# Patient Record
Sex: FEMALE | Race: WHITE | NOT HISPANIC OR LATINO | Employment: FULL TIME | URBAN - NONMETROPOLITAN AREA
[De-identification: names, ages, dates, MRNs, and addresses within clinical notes are randomized per-mention and may not be internally consistent; named-entity substitution may affect disease eponyms.]

---

## 2017-01-31 ENCOUNTER — OFFICE VISIT (OUTPATIENT)
Dept: RETAIL CLINIC | Facility: CLINIC | Age: 42
End: 2017-01-31

## 2017-01-31 VITALS
HEART RATE: 100 BPM | WEIGHT: 126.8 LBS | TEMPERATURE: 99.4 F | OXYGEN SATURATION: 98 % | SYSTOLIC BLOOD PRESSURE: 107 MMHG | RESPIRATION RATE: 18 BRPM | BODY MASS INDEX: 23.19 KG/M2 | DIASTOLIC BLOOD PRESSURE: 80 MMHG

## 2017-01-31 DIAGNOSIS — J11.1 INFLUENZA: Primary | ICD-10-CM

## 2017-01-31 LAB
EXPIRATION DATE: ABNORMAL
FLUAV AG NPH QL: POSITIVE
FLUBV AG NPH QL: NEGATIVE
INTERNAL CONTROL: ABNORMAL
Lab: ABNORMAL

## 2017-01-31 PROCEDURE — 87804 INFLUENZA ASSAY W/OPTIC: CPT | Performed by: NURSE PRACTITIONER

## 2017-01-31 PROCEDURE — 99213 OFFICE O/P EST LOW 20 MIN: CPT | Performed by: NURSE PRACTITIONER

## 2017-01-31 RX ORDER — DEXTROMETHORPHAN HYDROBROMIDE AND PROMETHAZINE HYDROCHLORIDE 15; 6.25 MG/5ML; MG/5ML
5 SYRUP ORAL EVERY 6 HOURS PRN
Qty: 120 ML | Refills: 0 | Status: SHIPPED | OUTPATIENT
Start: 2017-01-31 | End: 2018-07-17

## 2017-01-31 NOTE — MR AVS SNAPSHOT
"                        Tahira Vessels   1/31/2017 3:00 PM   Office Visit    Dept Phone:  [phone]   Encounter #:  [id number]    Provider:  SILVINO MCGILL   Department:  [facility]                Your Full Care Plan              Today's Medication Changes          These changes are accurate as of: 1/31/17  4:12 PM.  If you have any questions, ask your nurse or doctor.               New Medication(s)Ordered:     promethazine-dextromethorphan 6.25-15 MG/5ML syrup   Commonly known as:  PROMETHAZINE-DM   Take 5 mL by mouth Every 6 (Six) Hours As Needed for cough.            Where to Get Your Medications      These medications were sent to [facility]/pharmacy #[id number] - [place], [place] - [address] - [phone]  - [phone] [address] [address]     Phone:  [phone]     promethazine-dextromethorphan 6.25-15 MG/5ML syrup                  Your Updated Medication List          This list is accurate as of: 1/31/17  4:12 PM.  Always use your most recent med list.                ALEVE PO       CALCIUM + D PO       MUCINEX ALLERGY PO       MULTIVITAMIN ADULT PO       promethazine-dextromethorphan 6.25-15 MG/5ML syrup   Commonly known as:  PROMETHAZINE-DM   Take 5 mL by mouth Every 6 (Six) Hours As Needed for cough.               We Performed the Following     POC Influenza A / B       You Were Diagnosed With        Codes Comments    Influenza    -  Primary ICD-10-CM: J11.1  ICD-9-CM: 487.1       Instructions    Influenza, Adult  Influenza (\"the flu\") is a viral infection of the respiratory tract. It occurs more often in winter months because people spend more time in close contact with one another. Influenza can make you feel very sick. Influenza easily spreads from person to person (contagious).  CAUSES   Influenza is caused by a virus that infects the respiratory tract. You can catch the virus by breathing in droplets from an infected person's cough or sneeze. You can also " catch the virus by touching something that was recently contaminated with the virus and then touching your mouth, nose, or eyes.  RISKS AND COMPLICATIONS  You may be at risk for a more severe case of influenza if you smoke cigarettes, have diabetes, have chronic heart disease (such as heart failure) or lung disease (such as asthma), or if you have a weakened immune system. Elderly people and pregnant women are also at risk for more serious infections. The most common problem of influenza is a lung infection (pneumonia). Sometimes, this problem can require emergency medical care and may be life threatening.  SIGNS AND SYMPTOMS   Symptoms typically last 4 to 10 days and may include:  · Fever.  · Chills.  · Headache, body aches, and muscle aches.  · Sore throat.  · Chest discomfort and cough.  · Poor appetite.  · Weakness or feeling tired.  · Dizziness.  · Nausea or vomiting.  DIAGNOSIS   Diagnosis of influenza is often made based on your history and a physical exam. A nose or throat swab test can be done to confirm the diagnosis.  TREATMENT   In mild cases, influenza goes away on its own. Treatment is directed at relieving symptoms. For more severe cases, your health care provider may prescribe antiviral medicines to shorten the sickness. Antibiotic medicines are not effective because the infection is caused by a virus, not by bacteria.  HOME CARE INSTRUCTIONS  · Take medicines only as directed by your health care provider.  · Use a cool mist humidifier to make breathing easier.  · Get plenty of rest until your temperature returns to normal. This usually takes 3 to 4 days.  · Drink enough fluid to keep your urine clear or pale yellow.  · Cover your mouth and nose when coughing or sneezing, and wash your hands well to prevent the virus from spreading.  · Stay home from work or school until the fever is gone for at least 1 full day.  PREVENTION   An annual influenza vaccination (flu shot) is the best way to avoid  getting influenza. An annual flu shot is now routinely recommended for all adults in the [place]  SEEK MEDICAL CARE IF:  · You experience chest pain, your cough worsens, or you produce more mucus.  · You have nausea, vomiting, or diarrhea.  · Your fever returns or gets worse.  SEEK IMMEDIATE MEDICAL CARE IF:  · You have trouble breathing, you become short of breath, or your skin or nails become bluish.  · You have severe pain or stiffness in the neck.  · You develop a sudden headache, or pain in the face or ear.  · You have nausea or vomiting that you cannot control.  MAKE SURE YOU:   · Understand these instructions.  · Will watch your condition.  · Will get help right away if you are not doing well or get worse.     This information is not intended to replace advice given to you by your health care provider. Make sure you discuss any questions you have with your health care provider.     Document Released: 12/15/2001 Document Revised: 01/08/2016 Document Reviewed: 03/18/2013  [employer] Interactive Patient Education ©2016 [employer].    Dextromethorphan; Promethazine oral solution  What is this medicine?  DEXTROMETHORPHAN; PROMETHAZINE (dex troe meth OR fan; proe METH a zeen) is a cough suppressant and an antihistamine. It is used to treat coughing due to colds or allergies. This medicine will not treat an infection.  This medicine may be used for other purposes; ask your health care provider or pharmacist if you have questions.  What should I tell my health care provider before I take this medicine?  They need to know if you have any of the following conditions:  -asthma or other lung disease  -diabetes  -eczema  -seizure disorder  -serious or chronic illness  -sleep apnea  -an unusual or allergic reaction to dextromethorphan, promethazine, phenothiazines, other medicines, foods, dyes, or preservatives  -pregnant or trying to get pregnant  -breast-feeding  How should I use this medicine?  Take this medicine by mouth  with a glass of water. Follow the directions on the prescription label. Use a specially marked spoon or container to measure your medicine. Household spoons are not accurate. Take your doses at regular intervals. Do not take your medicine more often than directed.  Talk to your pediatrician regarding the use of this medicine in children. Special care may be needed. Do not use this medicine in children less than 2 years of age.  Patients over 65 years old may have a stronger reaction and need a smaller dose.  Overdosage: If you think you have taken too much of this medicine contact a poison control center or emergency room at once.  NOTE: This medicine is only for you. Do not share this medicine with others.  What if I miss a dose?  If you miss a dose, take it as soon as you can. If it is almost time for your next dose, take only that dose. Do not take double or extra doses.  What may interact with this medicine?  Do not take this medicine with any of the following medications:  -MAOIs like Carbex, Eldepryl, Marplan, Nardil, and Parnate  This medicine may also interact with the following medications:  -alcohol or alcohol-containing products  -barbiturate medicines like phenobarbital  -epinephrine  -medicines for depression, anxiety or psychotic disturbances  -medicines for Parkinson's disease  -medicines for sleep  -medicines for the stomach like metoclopramide, dicyclomine, glycopyrrolate  -pain medicines  -radio contrast dyes  -sibutramine  -some medicines for cold or allergies  This list may not describe all possible interactions. Give your health care provider a list of all the medicines, herbs, non-prescription drugs, or dietary supplements you use. Also tell them if you smoke, drink alcohol, or use illegal drugs. Some items may interact with your medicine.  What should I watch for while using this medicine?  Tell your doctor if your symptoms do not improve or if they get worse.  You may get drowsy or dizzy. Do  not drive, use machinery, or do anything that needs mental alertness until you know how this medicine affects you. Do not stand or sit up quickly, especially if you are an older patient. This reduces the risk of dizzy or fainting spells. Alcohol may interfere with the effect of this medicine. Avoid alcoholic drinks.  This medicine can make you more sensitive to the sun. Keep out of the sun. If you cannot avoid being in the sun, wear protective clothing and use sunscreen. Do not use sun lamps or tanning beds/booths.  What side effects may I notice from receiving this medicine?  Side effects that you should report to your doctor or health care professional as soon as possible:  -allergic reactions like skin rash, itching or hives, swelling of the face, lips, or tongue  -breathing problems  -changes in vision  -confused, disoriented, excitable  -fast, irregular heartbeat  -fever, sweating  -hallucinations  -high or low blood pressure  -lightheaded  -muscle stiffness  -seizures  -tremors, twitches  -yellow eyes or skin  Side effects that usually do not require medical attention (report to your doctor or health care professional if they continue or are bothersome):  -congestion in the nose  -dry mouth  -nausea, vomiting  -stomach upset  -trouble sleeping  This list may not describe all possible side effects. Call your doctor for medical advice about side effects. You may report side effects to FDA at 0-078-FDA-9305.  Where should I keep my medicine?  Keep out of the reach of children.  Store at room temperature between 20 and 25 degrees C (68 and 77 degrees F). Protect from light. Throw away any unused medicine after the expiration date.  NOTE: This sheet is a summary. It may not cover all possible information. If you have questions about this medicine, talk to your doctor, pharmacist, or health care provider.     © 2016, Elsevier/[employer]. (2009-04-06 17:01:49)    Your flu test was positive for influenza A.   "Increase rest and fluids. Continue Aleve or Advil as per package instructions for fever. Okay to take Tylenol every 4-6 hours. Take promethazine DM as needed to decrease drainage and cough.  Use caution as it will likely make you drowsy. Okay to take plain Mucinex along with the promethazine DM.  You can take Mucinex DM during the day and use the promethazine DM at bedtime.   If symptoms worsen please see your primary care provider.     Patient Instructions History      Upcoming Appointments     Visit Type Date Time Department    OFFICE VISIT 2017  3:00 PM [facility]      [employer] Signup     [facility] [employer] allows you to send messages to your doctor, view your test results, renew your prescriptions, schedule appointments, and more. To sign up, go to [employer] and click on the Sign Up Now link in the New User? box. Enter your [employer] Activation Code exactly as it appears below along with the last four digits of your Social Security Number and your Date of Birth () to complete the sign-up process. If you do not sign up before the expiration date, you must request a new code.    [employer] Activation Code: [email or web address]  Expires: 2017  4:11 PM    If you have questions, you can email [email or web address] or call [phone] to talk to our [employer] staff. Remember, [employer] is NOT to be used for urgent needs. For medical emergencies, dial 911.               Other Info from Your Visit           Allergies     No Known Allergies      Reason for Visit     Generalized Body Aches body aches, mainly the back began on today    Cough dry, hacking cough for the past 2 days    Earache Patient states she has had intermittent \"shooting pain\" in the left ear on today    Fever LOW GRADE FEVER    URI Patient states she has feeling of chest tightness which began on today      Vital Signs     Blood Pressure Pulse Temperature Respirations Weight Last Menstrual Period    107/80 (BP " Location: Right arm, Patient Position: Sitting) 100 99.4 °F (37.4 °C) (Oral) 18 126 lb 12.8 oz (57.5 kg) 01/06/2017    Oxygen Saturation Body Mass Index Smoking Status             98% 23.19 kg/m2 Never Smoker         Problems and Diagnoses Noted     Flu    -  Primary

## 2017-01-31 NOTE — PROGRESS NOTES
"  Chief Complaint   Patient presents with   • Generalized Body Aches     body aches, mainly the back began on today   • Cough     dry, hacking cough for the past 2 days   • Earache     Patient states she has had intermittent \"shooting pain\" in the left ear on today   • Fever     LOW GRADE FEVER   • URI     Patient states she has feeling of chest tightness which began on today     Ata Dinh is a 41 y.o. female who presents to the clinic today with complaints of a dry hacky cough which started two days ago.  She reports early  morning she just didn't feel well. She felt like she was getting a chest cold. She has had a low grade fever and fatigue.  She started having a little upper anterior chest tightness today which improves with cough. Today she has felt worse and has body aches. She has been taking Mucinex DM and took Aleve earlier today. Her  has had a sore throat and low grade fever.     The following portions of the patient's history were reviewed and updated as appropriate: allergies, past family history, past medical history, past social history, past surgical history and problem list.    Current Outpatient Prescriptions:   •  Calcium Citrate-Vitamin D (CALCIUM + D PO), Take  by mouth., Disp: , Rfl:   •  Mucinex DM  •  Multiple Vitamins-Minerals (MULTIVITAMIN ADULT PO), Take  by mouth., Disp: , Rfl:   •  Naproxen Sodium (ALEVE PO), Take  by mouth As Needed (last taken around noon today)., Disp: , Rfl:     Allergies:  Review of patient's allergies indicates no known allergies.  History reviewed. No pertinent past medical history.  Past Surgical History   Procedure Laterality Date   •  section     • Bowel resection       intassuception as an infant.   • [place] tooth extraction       Family History   Problem Relation Age of Onset   • Leukemia Father    • No Known Problems Mother    • Hypertension Brother    • Breast cancer Neg Hx    • Uterine cancer Neg Hx    • Ovarian cancer " Neg Hx    • Colon cancer Neg Hx    • Melanoma Neg Hx    • Prostate cancer Neg Hx      Social History   Substance Use Topics   • Smoking status: Never Smoker   • Smokeless tobacco: Never Used   • Alcohol use Yes       Review of Systems  Review of Systems   Constitutional: Positive for fatigue and fever. Negative for chills.   HENT: Positive for ear pain (left ear shooting pain off and on today) and postnasal drip. Negative for sinus pressure and sore throat.    Respiratory: Positive for cough (dry) and chest tightness (a little better with cough). Negative for shortness of breath and wheezing.    Neurological: Negative for headaches.       Objective   Visit Vitals   • /80 (BP Location: Right arm, Patient Position: Sitting)   • Pulse 100   • Temp 99.4 °F (37.4 °C) (Oral)   • Resp 18   • Wt 126 lb 12.8 oz (57.5 kg)   • LMP 01/06/2017   • SpO2 98%   • BMI 23.19 kg/m2     Last 2 weights    01/31/17  1508   Weight: 126 lb 12.8 oz (57.5 kg)       Physical Exam   Constitutional: She is oriented to person, place, and time. She appears well-developed and well-nourished. She is cooperative. She appears ill (mildly). No distress.   HENT:   Head: Normocephalic and atraumatic.   Right Ear: External ear normal.   Left Ear: External ear normal.   Nose: Nose normal. Right sinus exhibits no maxillary sinus tenderness and no frontal sinus tenderness. Left sinus exhibits no maxillary sinus tenderness and no frontal sinus tenderness.   Mouth/Throat: Posterior oropharyngeal erythema (minimal erythema, mild cobblestoning, clear post nasal drip noted) present. Tonsils are 2+ on the right. Tonsils are 2+ on the left. No tonsillar exudate.   Bilateral canals clear until near TM where there is cerumen obscurring the TM, very small portion of both TMs visualized and appeared gray. Pt declined to have ears cleaned today.   Eyes: Conjunctivae, EOM and lids are normal. Pupils are equal, round, and reactive to light.   Neck: Trachea normal  and normal range of motion. Neck supple.   Cardiovascular: Normal rate, regular rhythm, S1 normal, S2 normal and normal heart sounds.    Pulmonary/Chest: Effort normal and breath sounds normal. She has no wheezes. She has no rhonchi. She has no rales.   Lymphadenopathy:     She has no cervical adenopathy.   Neurological: She is alert and oriented to person, place, and time. Coordination and gait normal.   Skin: Skin is warm, dry and intact.   Psychiatric: She has a normal mood and affect. Her speech is normal and behavior is normal.   Vitals reviewed.      Assessment/Plan     Tahira was seen today for generalized body aches, cough, earache, fever and uri.    Diagnoses and all orders for this visit:    Influenza  -     POC Influenza A / B    Other orders  -     promethazine-dextromethorphan (PROMETHAZINE-DM) 6.25-15 MG/5ML syrup; Take 5 mL by mouth Every 6 (Six) Hours As Needed for cough.       Increase rest and fluids. Continue Aleve or Advil as per package instructions for fever. Okay to take Tylenol every 4-6 hours. Take promethazine DM as needed to decrease drainage and cough.  Use caution as it will likely make you drowsy. Okay to take plain Mucinex along with the promethazine DM.  You can take Mucinex DM during the day and use the promethazine DM at bedtime.   If symptoms worsen please see your primary care provider.

## 2017-01-31 NOTE — PATIENT INSTRUCTIONS
"Influenza, Adult  Influenza (\"the flu\") is a viral infection of the respiratory tract. It occurs more often in winter months because people spend more time in close contact with one another. Influenza can make you feel very sick. Influenza easily spreads from person to person (contagious).  CAUSES   Influenza is caused by a virus that infects the respiratory tract. You can catch the virus by breathing in droplets from an infected person's cough or sneeze. You can also catch the virus by touching something that was recently contaminated with the virus and then touching your mouth, nose, or eyes.  RISKS AND COMPLICATIONS  You may be at risk for a more severe case of influenza if you smoke cigarettes, have diabetes, have chronic heart disease (such as heart failure) or lung disease (such as asthma), or if you have a weakened immune system. Elderly people and pregnant women are also at risk for more serious infections. The most common problem of influenza is a lung infection (pneumonia). Sometimes, this problem can require emergency medical care and may be life threatening.  SIGNS AND SYMPTOMS   Symptoms typically last 4 to 10 days and may include:  · Fever.  · Chills.  · Headache, body aches, and muscle aches.  · Sore throat.  · Chest discomfort and cough.  · Poor appetite.  · Weakness or feeling tired.  · Dizziness.  · Nausea or vomiting.  DIAGNOSIS   Diagnosis of influenza is often made based on your history and a physical exam. A nose or throat swab test can be done to confirm the diagnosis.  TREATMENT   In mild cases, influenza goes away on its own. Treatment is directed at relieving symptoms. For more severe cases, your health care provider may prescribe antiviral medicines to shorten the sickness. Antibiotic medicines are not effective because the infection is caused by a virus, not by bacteria.  HOME CARE INSTRUCTIONS  · Take medicines only as directed by your health care provider.  · Use a cool mist humidifier " to make breathing easier.  · Get plenty of rest until your temperature returns to normal. This usually takes 3 to 4 days.  · Drink enough fluid to keep your urine clear or pale yellow.  · Cover your mouth and nose when coughing or sneezing, and wash your hands well to prevent the virus from spreading.  · Stay home from work or school until the fever is gone for at least 1 full day.  PREVENTION   An annual influenza vaccination (flu shot) is the best way to avoid getting influenza. An annual flu shot is now routinely recommended for all adults in the [place]  SEEK MEDICAL CARE IF:  · You experience chest pain, your cough worsens, or you produce more mucus.  · You have nausea, vomiting, or diarrhea.  · Your fever returns or gets worse.  SEEK IMMEDIATE MEDICAL CARE IF:  · You have trouble breathing, you become short of breath, or your skin or nails become bluish.  · You have severe pain or stiffness in the neck.  · You develop a sudden headache, or pain in the face or ear.  · You have nausea or vomiting that you cannot control.  MAKE SURE YOU:   · Understand these instructions.  · Will watch your condition.  · Will get help right away if you are not doing well or get worse.     This information is not intended to replace advice given to you by your health care provider. Make sure you discuss any questions you have with your health care provider.     Document Released: 12/15/2001 Document Revised: 01/08/2016 Document Reviewed: 03/18/2013  [employer] Patient Education ©2016 [employer]    Dextromethorphan; Promethazine oral solution  What is this medicine?  DEXTROMETHORPHAN; PROMETHAZINE (dex troe meth OR fan; proe METH a zeen) is a cough suppressant and an antihistamine. It is used to treat coughing due to colds or allergies. This medicine will not treat an infection.  This medicine may be used for other purposes; ask your health care provider or pharmacist if you have questions.  What should I tell my health  care provider before I take this medicine?  They need to know if you have any of the following conditions:  -asthma or other lung disease  -diabetes  -eczema  -seizure disorder  -serious or chronic illness  -sleep apnea  -an unusual or allergic reaction to dextromethorphan, promethazine, phenothiazines, other medicines, foods, dyes, or preservatives  -pregnant or trying to get pregnant  -breast-feeding  How should I use this medicine?  Take this medicine by mouth with a glass of water. Follow the directions on the prescription label. Use a specially marked spoon or container to measure your medicine. Household spoons are not accurate. Take your doses at regular intervals. Do not take your medicine more often than directed.  Talk to your pediatrician regarding the use of this medicine in children. Special care may be needed. Do not use this medicine in children less than 2 years of age.  Patients over 65 years old may have a stronger reaction and need a smaller dose.  Overdosage: If you think you have taken too much of this medicine contact a poison control center or emergency room at once.  NOTE: This medicine is only for you. Do not share this medicine with others.  What if I miss a dose?  If you miss a dose, take it as soon as you can. If it is almost time for your next dose, take only that dose. Do not take double or extra doses.  What may interact with this medicine?  Do not take this medicine with any of the following medications:  -MAOIs like Carbex, Eldepryl, Marplan, Nardil, and Parnate  This medicine may also interact with the following medications:  -alcohol or alcohol-containing products  -barbiturate medicines like phenobarbital  -epinephrine  -medicines for depression, anxiety or psychotic disturbances  -medicines for Parkinson's disease  -medicines for sleep  -medicines for the stomach like metoclopramide, dicyclomine, glycopyrrolate  -pain medicines  -radio contrast dyes  -sibutramine  -some medicines  for cold or allergies  This list may not describe all possible interactions. Give your health care provider a list of all the medicines, herbs, non-prescription drugs, or dietary supplements you use. Also tell them if you smoke, drink alcohol, or use illegal drugs. Some items may interact with your medicine.  What should I watch for while using this medicine?  Tell your doctor if your symptoms do not improve or if they get worse.  You may get drowsy or dizzy. Do not drive, use machinery, or do anything that needs mental alertness until you know how this medicine affects you. Do not stand or sit up quickly, especially if you are an older patient. This reduces the risk of dizzy or fainting spells. Alcohol may interfere with the effect of this medicine. Avoid alcoholic drinks.  This medicine can make you more sensitive to the sun. Keep out of the sun. If you cannot avoid being in the sun, wear protective clothing and use sunscreen. Do not use sun lamps or tanning beds/booths.  What side effects may I notice from receiving this medicine?  Side effects that you should report to your doctor or health care professional as soon as possible:  -allergic reactions like skin rash, itching or hives, swelling of the face, lips, or tongue  -breathing problems  -changes in vision  -confused, disoriented, excitable  -fast, irregular heartbeat  -fever, sweating  -hallucinations  -high or low blood pressure  -lightheaded  -muscle stiffness  -seizures  -tremors, twitches  -yellow eyes or skin  Side effects that usually do not require medical attention (report to your doctor or health care professional if they continue or are bothersome):  -congestion in the nose  -dry mouth  -nausea, vomiting  -stomach upset  -trouble sleeping  This list may not describe all possible side effects. Call your doctor for medical advice about side effects. You may report side effects to FDA at 5-906-FDA-5660.  Where should I keep my medicine?  Keep out of  the reach of children.  Store at room temperature between 20 and 25 degrees C (68 and 77 degrees F). Protect from light. Throw away any unused medicine after the expiration date.  NOTE: This sheet is a summary. It may not cover all possible information. If you have questions about this medicine, talk to your doctor, pharmacist, or health care provider.     © 2016, [employer]. (2009-04-06 17:01:49)    Your flu test was positive for influenza A.  Increase rest and fluids. Continue Aleve or Advil as per package instructions for fever. Okay to take Tylenol every 4-6 hours. Take promethazine DM as needed to decrease drainage and cough.  Use caution as it will likely make you drowsy. Okay to take plain Mucinex along with the promethazine DM.  You can take Mucinex DM during the day and use the promethazine DM at bedtime.   If symptoms worsen please see your primary care provider.

## 2017-03-10 ENCOUNTER — TELEPHONE (OUTPATIENT)
Dept: OBSTETRICS AND GYNECOLOGY | Facility: CLINIC | Age: 42
End: 2017-03-10

## 2017-03-10 NOTE — TELEPHONE ENCOUNTER
Patient has been an no show for lab orders. Do you want to cancel these orders or contact patient.

## 2017-04-26 ENCOUNTER — OFFICE VISIT (OUTPATIENT)
Dept: RETAIL CLINIC | Facility: CLINIC | Age: 42
End: 2017-04-26

## 2017-04-26 DIAGNOSIS — H61.23 BILATERAL IMPACTED CERUMEN: Primary | ICD-10-CM

## 2017-04-26 PROCEDURE — 69210 REMOVE IMPACTED EAR WAX UNI: CPT | Performed by: NURSE PRACTITIONER

## 2017-04-26 NOTE — PROGRESS NOTES
Procedure   Ear Cerumen Removal Instrumentation  Date/Time: 4/26/2017 8:50 AM  Performed by: WILLIAMS RAINEY  Authorized by: WILLIAMS RAINEY  Consent: Verbal consent obtained.  Consent given by: patient  Patient understanding: patient states understanding of the procedure being performed  Location details: bilateral ears  Procedure type: curette and irrigation  Patient tolerance: Patient tolerated the procedure well with no immediate complications  Comments: Large amount of impacted cerumen noted in bilateral ear canals; TM obstructed by cerumen.  1:1 solution of water and hydrogen peroxide used as irrigation along with curette to remove cerumen.  Large amount of dark brown cerumen removed from bilateral ear canals.  TM's visualized and intact.            Discussed with patient use of OTC products, such as Debrox, as needed to help prevent build up and impaction of cerumen.

## 2018-07-17 ENCOUNTER — OFFICE VISIT (OUTPATIENT)
Dept: OBSTETRICS AND GYNECOLOGY | Facility: CLINIC | Age: 43
End: 2018-07-17

## 2018-07-17 VITALS
DIASTOLIC BLOOD PRESSURE: 66 MMHG | BODY MASS INDEX: 26.68 KG/M2 | SYSTOLIC BLOOD PRESSURE: 104 MMHG | HEIGHT: 62 IN | WEIGHT: 145 LBS

## 2018-07-17 DIAGNOSIS — Z01.419 WELL WOMAN EXAM WITH ROUTINE GYNECOLOGICAL EXAM: Primary | ICD-10-CM

## 2018-07-17 DIAGNOSIS — N64.4 BREAST TENDERNESS: ICD-10-CM

## 2018-07-17 DIAGNOSIS — N60.02 CYST OF LEFT BREAST: ICD-10-CM

## 2018-07-17 DIAGNOSIS — R10.2 PELVIC PAIN: ICD-10-CM

## 2018-07-17 PROCEDURE — 99213 OFFICE O/P EST LOW 20 MIN: CPT | Performed by: NURSE PRACTITIONER

## 2018-07-17 PROCEDURE — G0123 SCREEN CERV/VAG THIN LAYER: HCPCS | Performed by: NURSE PRACTITIONER

## 2018-07-17 PROCEDURE — 99396 PREV VISIT EST AGE 40-64: CPT | Performed by: NURSE PRACTITIONER

## 2018-07-17 PROCEDURE — 87624 HPV HI-RISK TYP POOLED RSLT: CPT | Performed by: NURSE PRACTITIONER

## 2018-07-17 NOTE — PROGRESS NOTES
"Ata Dinh is a 42 y.o. female    Here for yearly check up. Has C/O breast tenderness.      Gynecologic Exam   The patient's pertinent negatives include no pelvic pain, vaginal bleeding or vaginal discharge. The patient is experiencing no pain. Pertinent negatives include no abdominal pain, anorexia, back pain, chills, constipation, diarrhea, discolored urine, dysuria, fever, flank pain, frequency, headaches, hematuria, joint pain, joint swelling, nausea, painful intercourse, rash, sore throat, urgency or vomiting. Nothing aggravates the symptoms. She is sexually active. She uses the rhythm method for contraception. Her menstrual history has been regular.         /66   Ht 157.5 cm (62\")   Wt 65.8 kg (145 lb)   LMP 2018 (Exact Date)   BMI 26.52 kg/m²     Outpatient Encounter Prescriptions as of 2018   Medication Sig Dispense Refill   • Calcium Citrate-Vitamin D (CALCIUM + D PO) Take  by mouth.     • Multiple Vitamins-Minerals (MULTIVITAMIN ADULT PO) Take  by mouth.     • Naproxen Sodium (ALEVE PO) Take  by mouth As Needed (last taken around noon today).     • [DISCONTINUED] Fexofenadine HCl (MUCINEX ALLERGY PO) Take  by mouth As Needed.     • [DISCONTINUED] promethazine-dextromethorphan (PROMETHAZINE-DM) 6.25-15 MG/5ML syrup Take 5 mL by mouth Every 6 (Six) Hours As Needed for cough. 120 mL 0     No facility-administered encounter medications on file as of 2018.        Surgical History  Past Surgical History:   Procedure Laterality Date   •  SECTION     • COLON RESECTION      intassuception as an infant.   • WISDOM TOOTH EXTRACTION         Family History  Family History   Problem Relation Age of Onset   • Leukemia Father    • No Known Problems Mother    • Hypertension Brother    • Breast cancer Neg Hx    • Uterine cancer Neg Hx    • Ovarian cancer Neg Hx    • Colon cancer Neg Hx    • Melanoma Neg Hx    • Prostate cancer Neg Hx        The following portions of the " patient's history were reviewed and updated as appropriate: allergies, current medications, past family history, past medical history, past social history, past surgical history and problem list.    Review of Systems   Constitutional: Negative for activity change, appetite change, chills, diaphoresis, fatigue, fever, unexpected weight gain and unexpected weight loss.   HENT: Negative for congestion, dental problem, drooling, ear discharge, ear pain, facial swelling, hearing loss, mouth sores, nosebleeds, postnasal drip, rhinorrhea, sinus pressure, sneezing, sore throat, tinnitus, trouble swallowing and voice change.    Eyes: Negative for blurred vision, double vision, photophobia, pain, discharge, redness, itching and visual disturbance.   Respiratory: Negative for apnea, cough, choking, chest tightness, shortness of breath, wheezing and stridor.    Cardiovascular: Negative for chest pain, palpitations and leg swelling.   Gastrointestinal: Negative for abdominal distention, abdominal pain, anal bleeding, anorexia, blood in stool, constipation, diarrhea, nausea, rectal pain, vomiting, GERD and indigestion.   Endocrine: Negative for cold intolerance, heat intolerance, polydipsia, polyphagia, polyuria and breast discharge.   Genitourinary: Positive for breast pain. Negative for urinary incontinence, decreased libido, decreased urine volume, difficulty urinating, dyspareunia, dysuria, flank pain, frequency, genital sores, hematuria, menstrual problem, pelvic pain, urgency, vaginal bleeding, vaginal discharge, vaginal pain and breast lump.   Musculoskeletal: Negative for arthralgias, back pain, gait problem, joint pain, joint swelling, myalgias, neck pain, neck stiffness and bursitis.   Skin: Negative for color change, dry skin and rash.   Allergic/Immunologic: Negative for environmental allergies, food allergies and immunocompromised state.   Neurological: Negative for dizziness, tremors, seizures, syncope, facial  asymmetry, speech difficulty, weakness, light-headedness, numbness, headache, memory problem and confusion.   Hematological: Negative for adenopathy. Does not bruise/bleed easily.   Psychiatric/Behavioral: Negative for agitation, behavioral problems, decreased concentration, dysphoric mood, hallucinations, self-injury, sleep disturbance, suicidal ideas, negative for hyperactivity, depressed mood and stress. The patient is not nervous/anxious.        Objective   Physical Exam   Constitutional: She is oriented to person, place, and time. She appears well-developed and well-nourished. No distress.   HENT:   Head: Normocephalic.   Right Ear: External ear normal.   Left Ear: External ear normal.   Nose: Nose normal.   Mouth/Throat: Oropharynx is clear and moist.   Eyes: Conjunctivae are normal. Right eye exhibits no discharge. Left eye exhibits no discharge. No scleral icterus.   Neck: Normal range of motion. Neck supple. Carotid bruit is not present. No tracheal deviation present. No thyromegaly present.   Cardiovascular: Normal rate, regular rhythm, normal heart sounds and intact distal pulses.    No murmur heard.  Pulmonary/Chest: Effort normal and breath sounds normal. No respiratory distress. She has no wheezes. Right breast exhibits no inverted nipple, no mass, no nipple discharge, no skin change and no tenderness. Left breast exhibits no inverted nipple, no mass, no nipple discharge, no skin change and no tenderness. Breasts are symmetrical. There is no breast swelling.       Abdominal: Soft. She exhibits no distension and no mass. There is no tenderness. There is no guarding. No hernia. Hernia confirmed negative in the right inguinal area and confirmed negative in the left inguinal area.   Genitourinary: Rectum normal, vagina normal and uterus normal. Rectal exam shows no mass. No breast tenderness, discharge or bleeding. Pelvic exam was performed with patient supine. There is no rash, tenderness, lesion or  injury on the right labia. There is no rash, tenderness, lesion or injury on the left labia. Uterus is not enlarged, not fixed and not tender. Cervix exhibits no motion tenderness, no discharge and no friability. Right adnexum displays no mass, no tenderness and no fullness. Left adnexum displays no mass, no tenderness and no fullness. No erythema, tenderness or bleeding in the vagina. No foreign body in the vagina. No signs of injury around the vagina. No vaginal discharge found.   Genitourinary Comments:   BSU normal  Urethral meatus  Normal  Perineum  Normal   Musculoskeletal: Normal range of motion. She exhibits no edema or tenderness.   Lymphadenopathy:        Head (right side): No submental, no submandibular, no tonsillar, no preauricular, no posterior auricular and no occipital adenopathy present.        Head (left side): No submental, no submandibular, no tonsillar, no preauricular, no posterior auricular and no occipital adenopathy present.     She has no cervical adenopathy.        Right cervical: No superficial cervical, no deep cervical and no posterior cervical adenopathy present.       Left cervical: No superficial cervical, no deep cervical and no posterior cervical adenopathy present.     She has no axillary adenopathy.        Right: No inguinal adenopathy present.        Left: No inguinal adenopathy present.   Neurological: She is alert and oriented to person, place, and time. Coordination normal.   Skin: Skin is warm and dry. No bruising and no rash noted. She is not diaphoretic. No erythema.   Psychiatric: She has a normal mood and affect. Her behavior is normal. Judgment and thought content normal.   Nursing note and vitals reviewed.      Assessment/Plan   Tahira was seen today for gynecologic exam.    Diagnoses and all orders for this visit:    Well woman exam with routine gynecological exam  Normal GYN exam. Will RTO for lab work . Encouraged SBE, pt is aware how to do self breast exam and the  importance of same. Discussed weight management and importance of maintaining a healthy weight. Discussed Vitamin D intake and the importance of adequate vitamin D for both Bone Health and a healthy immune system.  Discussed Daily exercise and the importance of same, in regards to a healthy heart as well as helping to maintain her weight and improving her mental health.  BMI 26.5.  Mammogram will be scheduled at [facility], per her request. Pap smear is done per ASCCP guidelines.  -     Liquid-based Pap Smear, Screening  -     CBC & Differential  -     Comprehensive Metabolic Panel  -     Lipid Panel With LDL / HDL Ratio  -     TSH  -     T3, Uptake  -     Vitamin D 25 Hydroxy  -     T4, Free  -     Hemoglobin A1c  -     UA / M With / Rflx Culture([facility] ONLY) - Urine, Clean Catch  -     Mammo Screening Digital Tomosynthesis Bilateral With CAD; Future    Breast tenderness  Comments:  Has moderate fibrocystic changes. Will try Vitamin E 400IU and Oil of Evening Primrose. Mammo is scheduled.     Pelvic pain  Comments:  Pt has occassional pelvic pain. offered Pelvic U/S. Declines at the present time.     Cyst of left breast  Comments:  Pt has a 3cm cystic area in the left breast at 4:00. Pt states this has been present for several years. Having mammo at [facility], will get U/S PRN.         Patient's Body mass index is 26.52 kg/m². BMI is above normal parameters. Recommendations include: educational material, exercise counseling and nutrition counseling.      Irais Calvillo, APRN  7/17/2018

## 2018-07-17 NOTE — PROGRESS NOTES
Attempted to obtain health maintenance information, patient unable to provide answers for the following items Tdap, Colonoscopy, Pneumococcal Vaccine, Diabetic Eye Exam, Diabetic Foot Exam, HPV Vaccine, Chlamydia Screening , Influenza Vaccine and Zoster Vaccine  .

## 2018-07-17 NOTE — PATIENT INSTRUCTIONS

## 2018-07-23 DIAGNOSIS — Z01.419 WELL WOMAN EXAM WITH ROUTINE GYNECOLOGICAL EXAM: ICD-10-CM

## 2018-07-25 ENCOUNTER — TELEPHONE (OUTPATIENT)
Dept: OBSTETRICS AND GYNECOLOGY | Facility: CLINIC | Age: 43
End: 2018-07-25

## 2018-07-25 NOTE — TELEPHONE ENCOUNTER
----- Message from EDWIN Hernandez sent at 7/24/2018 10:08 AM CDT -----  Normal 2D mammogram  __MOVM for pt to call for results. Mammogram normal.

## 2018-07-26 LAB
GEN CATEG CVX/VAG CYTO-IMP: ABNORMAL
LAB AP CASE REPORT: ABNORMAL
LAB AP GYN ADDITIONAL INFORMATION: ABNORMAL
PATH INTERP SPEC-IMP: ABNORMAL
STAT OF ADQ CVX/VAG CYTO-IMP: ABNORMAL

## 2018-07-27 ENCOUNTER — TELEPHONE (OUTPATIENT)
Dept: OBSTETRICS AND GYNECOLOGY | Facility: CLINIC | Age: 43
End: 2018-07-27

## 2018-07-27 ENCOUNTER — DOCUMENTATION (OUTPATIENT)
Dept: OBSTETRICS AND GYNECOLOGY | Facility: CLINIC | Age: 43
End: 2018-07-27

## 2018-07-27 NOTE — TELEPHONE ENCOUNTER
----- Message from EDWIN Hernandez sent at 7/26/2018  3:35 PM CDT -----  ASCUS, HPV negative.   Repeat cotesting in 3 years, per ASCCP guidelines.   Continue annual exams.   MOVM for pt to call for results. Pap normal (ASCUS w/negative HPV.

## 2018-09-26 ENCOUNTER — OFFICE VISIT (OUTPATIENT)
Dept: RETAIL CLINIC | Facility: CLINIC | Age: 43
End: 2018-09-26

## 2018-09-26 VITALS
DIASTOLIC BLOOD PRESSURE: 80 MMHG | TEMPERATURE: 98 F | SYSTOLIC BLOOD PRESSURE: 110 MMHG | HEART RATE: 85 BPM | OXYGEN SATURATION: 98 % | WEIGHT: 139.6 LBS | RESPIRATION RATE: 20 BRPM | HEIGHT: 62 IN | BODY MASS INDEX: 25.69 KG/M2

## 2018-09-26 DIAGNOSIS — H65.03 BILATERAL ACUTE SEROUS OTITIS MEDIA, RECURRENCE NOT SPECIFIED: Primary | ICD-10-CM

## 2018-09-26 PROCEDURE — 99213 OFFICE O/P EST LOW 20 MIN: CPT | Performed by: NURSE PRACTITIONER

## 2018-09-26 RX ORDER — METHYLPREDNISOLONE 4 MG/1
TABLET ORAL
Qty: 21 EACH | Refills: 0 | Status: SHIPPED | OUTPATIENT
Start: 2018-09-26 | End: 2018-10-22

## 2018-09-26 RX ORDER — LORATADINE 10 MG/1
10 TABLET ORAL DAILY
Qty: 30 TABLET | Refills: 5 | Status: SHIPPED | OUTPATIENT
Start: 2018-09-26 | End: 2019-02-04

## 2018-09-26 RX ORDER — FLUTICASONE PROPIONATE 50 MCG
2 SPRAY, SUSPENSION (ML) NASAL DAILY
Qty: 16 G | Refills: 5 | Status: SHIPPED | OUTPATIENT
Start: 2018-09-26 | End: 2019-02-04

## 2018-09-26 NOTE — PATIENT INSTRUCTIONS
Pt advised she has fluid behind ear drums and a lot of post nasal drip probably from viral or allergies.  No antibiotics needed because she is not runny fever or have any s/s of infection.  Instructed pt on medrol dospak, flonase, and claritin purpose, dosage and frequency to dry up drainage.  If she develops worsening s/s or fever pt will need reevaluation here or pcp.  Follow up as needed.

## 2018-09-26 NOTE — PROGRESS NOTES
Ata Dinh is a 42 y.o. female who presents to the clinic with:uri      Onset Monday with feeling achy all over.  A lot of post nasal drip and sore throat.         URI    This is a new problem. The current episode started in the past 7 days. The problem has been unchanged. There has been no fever (felt feverish Monday night but not sure). Associated symptoms include congestion, coughing, headaches, joint pain, nausea, sinus pain and a sore throat. Pertinent negatives include no abdominal pain, chest pain, ear pain, joint swelling, neck pain, plugged ear sensation, rhinorrhea, sneezing, vomiting or wheezing. Associated symptoms comments: Post nasal drip. She has tried NSAIDs for the symptoms. The treatment provided no relief.        The following portions of the patient's history were reviewed and updated as appropriate: allergies, current medications, past family history, past medical history, past social history, past surgical history and problem list.        Review of Systems   Constitutional: Negative for chills, diaphoresis and fever.   HENT: Positive for congestion, postnasal drip, sinus pain and sore throat. Negative for ear pain, rhinorrhea and sneezing.    Respiratory: Positive for cough. Negative for shortness of breath and wheezing.    Cardiovascular: Negative for chest pain.   Gastrointestinal: Positive for nausea. Negative for abdominal pain and vomiting.   Musculoskeletal: Positive for joint pain. Negative for neck pain.   Neurological: Positive for headaches.         Objective   Physical Exam   Constitutional: She is oriented to person, place, and time. Vital signs are normal. She appears well-developed and well-nourished.   HENT:   Head: Normocephalic and atraumatic.   Right Ear: Hearing, external ear and ear canal normal.   Left Ear: Hearing, external ear and ear canal normal.   Nose: Mucosal edema present. Right sinus exhibits no maxillary sinus tenderness and no frontal sinus  tenderness. Left sinus exhibits no maxillary sinus tenderness and no frontal sinus tenderness.   Mouth/Throat: Uvula is midline and mucous membranes are normal. Posterior oropharyngeal edema and posterior oropharyngeal erythema present. Tonsils are 4+ on the right. Tonsils are 4+ on the left. No tonsillar exudate.   Left tm dull and cloudy with no light reflex.  Right tm dull and cloudy with scattered light reflex   Eyes: Pupils are equal, round, and reactive to light. Conjunctivae are normal.   Neck: Neck supple.   Cardiovascular: Normal rate, regular rhythm, S1 normal, S2 normal and normal heart sounds.  Exam reveals no gallop, no S3, no S4 and no friction rub.    No murmur heard.  Pulmonary/Chest: Effort normal and breath sounds normal. No respiratory distress. She has no wheezes. She has no rales. She exhibits no tenderness.   Lymphadenopathy:     She has no cervical adenopathy.   Neurological: She is alert and oriented to person, place, and time.   Skin: Skin is warm, dry and intact.   Psychiatric: She has a normal mood and affect. Her speech is normal and behavior is normal. Judgment and thought content normal.   Vitals reviewed.        Assessment/Plan   Tahira was seen today for uri.    Diagnoses and all orders for this visit:    Bilateral acute serous otitis media, recurrence not specified    Other orders  -     MethylPREDNISolone (MEDROL, KORTNEY,) 4 MG tablet; Take as directed on package instructions.  -     loratadine (CLARITIN) 10 MG tablet; Take 1 tablet by mouth Daily.  -     fluticasone (FLONASE) 50 MCG/ACT nasal spray; 2 sprays into the nostril(s) as directed by provider Daily.          Pt advised she has fluid behind ear drums and a lot of post nasal drip probably from viral or allergies.  No antibiotics needed because she is not runny fever or have any s/s of infection.  Instructed pt on medrol dospak, flonase, and claritin purpose, dosage and frequency to dry up drainage.  If she develops worsening s/s  or fever pt will need reevaluation here or pcp.  Follow up as needed.

## 2018-10-01 ENCOUNTER — OFFICE VISIT (OUTPATIENT)
Dept: OBSTETRICS AND GYNECOLOGY | Facility: CLINIC | Age: 43
End: 2018-10-01

## 2018-10-01 ENCOUNTER — PROCEDURE VISIT (OUTPATIENT)
Dept: OBSTETRICS AND GYNECOLOGY | Facility: CLINIC | Age: 43
End: 2018-10-01

## 2018-10-01 VITALS
WEIGHT: 136 LBS | BODY MASS INDEX: 25.03 KG/M2 | DIASTOLIC BLOOD PRESSURE: 80 MMHG | SYSTOLIC BLOOD PRESSURE: 118 MMHG | HEIGHT: 62 IN

## 2018-10-01 DIAGNOSIS — R10.2 PELVIC PAIN: Primary | ICD-10-CM

## 2018-10-01 DIAGNOSIS — F41.9 ANXIETY: ICD-10-CM

## 2018-10-01 PROCEDURE — 99213 OFFICE O/P EST LOW 20 MIN: CPT | Performed by: NURSE PRACTITIONER

## 2018-10-01 PROCEDURE — 76830 TRANSVAGINAL US NON-OB: CPT | Performed by: OBSTETRICS & GYNECOLOGY

## 2018-10-01 RX ORDER — BUSPIRONE HYDROCHLORIDE 5 MG/1
5 TABLET ORAL 3 TIMES DAILY
Qty: 90 TABLET | Refills: 3 | Status: SHIPPED | OUTPATIENT
Start: 2018-10-01 | End: 2019-02-04

## 2018-10-01 NOTE — PROGRESS NOTES
Attempted to obtain health maintenance information, patient unable to provide answers for the following items Tdap, Colonoscopy, Pneumococcal Vaccine, Medicare Annual Wellness Exam, Lipid Panel, Diabetic Eye Exam, Diabetic Foot Exam, HPV Vaccine, Chlamydia Screening , Influenza Vaccine, HgbA1c and Zoster Vaccine.

## 2018-10-01 NOTE — PROGRESS NOTES
"Ata Dinh is a 42 y.o. female    Here for follow up of pelvic U/S for pelvic pain. She is also having a lot of anxiety and is often very short with her children.       Anxiety   Presents for initial visit. Onset was 1 to 4 weeks ago. The problem has been gradually worsening. Symptoms include excessive worry and nervous/anxious behavior. Patient reports no chest pain, compulsions, confusion, decreased concentration, depressed mood, dizziness, dry mouth, feeling of choking, hyperventilation, impotence, insomnia, irritability, malaise, muscle tension, nausea, obsessions, palpitations, panic, restlessness, shortness of breath or suicidal ideas. Symptoms occur most days. The severity of symptoms is mild. The symptoms are aggravated by family issues and specific phobias. The quality of sleep is good. Nighttime awakenings: occasional.     There are no known risk factors. Past treatments include nothing.   Pelvic Pain   The patient's primary symptoms include pelvic pain. The patient's pertinent negatives include no vaginal bleeding or vaginal discharge. This is a recurrent problem. The current episode started more than 1 month ago. The problem occurs intermittently. The problem has been waxing and waning. The pain is mild. The problem affects both sides. She is not pregnant. Pertinent negatives include no abdominal pain, anorexia, back pain, chills, constipation, diarrhea, discolored urine, dysuria, fever, flank pain, frequency, headaches, hematuria, joint pain, joint swelling, nausea, painful intercourse, rash, sore throat, urgency or vomiting. Nothing aggravates the symptoms. She has tried nothing for the symptoms. She is sexually active.         /80   Ht 157.5 cm (62\")   Wt 61.7 kg (136 lb)   LMP 09/19/2018 (Exact Date)   BMI 24.87 kg/m²     Outpatient Encounter Prescriptions as of 10/1/2018   Medication Sig Dispense Refill   • MethylPREDNISolone (MEDROL, KORTNEY,) 4 MG tablet Take as directed " on package instructions. 21 each 0   • Multiple Vitamins-Minerals (MULTIVITAMIN ADULT PO) Take  by mouth.     • Naproxen Sodium (ALEVE PO) Take  by mouth As Needed (last taken around noon today).     • busPIRone (BUSPAR) 5 MG tablet Take 1 tablet by mouth 3 (Three) Times a Day. 90 tablet 3   • Calcium Citrate-Vitamin D (CALCIUM + D PO) Take  by mouth.     • fluticasone (FLONASE) 50 MCG/ACT nasal spray 2 sprays into the nostril(s) as directed by provider Daily. 16 g 5   • loratadine (CLARITIN) 10 MG tablet Take 1 tablet by mouth Daily. 30 tablet 5     No facility-administered encounter medications on file as of 10/1/2018.        Surgical History  Past Surgical History:   Procedure Laterality Date   •  SECTION     • COLON RESECTION      intassuception as an infant.   • WISDOM TOOTH EXTRACTION         Family History  Family History   Problem Relation Age of Onset   • Leukemia Father    • No Known Problems Mother    • Hypertension Brother    • Breast cancer Neg Hx    • Uterine cancer Neg Hx    • Ovarian cancer Neg Hx    • Colon cancer Neg Hx    • Melanoma Neg Hx    • Prostate cancer Neg Hx        The following portions of the patient's history were reviewed and updated as appropriate: allergies, current medications, past family history, past medical history, past social history, past surgical history and problem list.    Review of Systems   Constitutional: Negative for activity change, appetite change, chills, diaphoresis, fatigue, fever, irritability, unexpected weight gain and unexpected weight loss.   HENT: Negative for congestion, dental problem, drooling, ear discharge, ear pain, facial swelling, hearing loss, mouth sores, nosebleeds, postnasal drip, rhinorrhea, sinus pressure, sneezing, sore throat, tinnitus, trouble swallowing and voice change.    Eyes: Negative for blurred vision, double vision, photophobia, pain, discharge, redness, itching and visual disturbance.   Respiratory: Negative for apnea,  cough, choking, chest tightness, shortness of breath, wheezing and stridor.    Cardiovascular: Negative for chest pain, palpitations and leg swelling.   Gastrointestinal: Negative for abdominal distention, abdominal pain, anal bleeding, anorexia, blood in stool, constipation, diarrhea, nausea, rectal pain, vomiting, GERD and indigestion.   Endocrine: Negative for cold intolerance, heat intolerance, polydipsia, polyphagia and polyuria.   Genitourinary: Positive for pelvic pain. Negative for breast discharge, urinary incontinence, decreased libido, decreased urine volume, difficulty urinating, dyspareunia, dysuria, flank pain, frequency, genital sores, hematuria, impotence, menstrual problem, urgency, vaginal bleeding, vaginal discharge, vaginal pain, breast lump and breast pain.   Musculoskeletal: Negative for arthralgias, back pain, gait problem, joint pain, joint swelling, myalgias, neck pain, neck stiffness and bursitis.   Skin: Negative for color change, dry skin and rash.   Allergic/Immunologic: Negative for environmental allergies, food allergies and immunocompromised state.   Neurological: Negative for dizziness, tremors, seizures, syncope, facial asymmetry, speech difficulty, weakness, light-headedness, numbness, headache, memory problem and confusion.   Hematological: Negative for adenopathy. Does not bruise/bleed easily.   Psychiatric/Behavioral: Negative for agitation, behavioral problems, decreased concentration, dysphoric mood, hallucinations, self-injury, sleep disturbance, suicidal ideas, negative for hyperactivity, depressed mood and stress. The patient is nervous/anxious. The patient does not have insomnia.        Objective   Physical Exam   Constitutional: She is oriented to person, place, and time. She appears well-developed and well-nourished.   HENT:   Head: Normocephalic and atraumatic.   Eyes: Conjunctivae are normal. Right eye exhibits no discharge. Left eye exhibits no discharge.   Neck:  Normal range of motion. Neck supple. No thyromegaly present.   Cardiovascular: Normal rate, regular rhythm and normal heart sounds.    Pulmonary/Chest: Effort normal and breath sounds normal.   Neurological: She is alert and oriented to person, place, and time.   Skin: Skin is warm and dry.   Psychiatric: She has a normal mood and affect. Her behavior is normal. Judgment and thought content normal.   Nursing note and vitals reviewed.      Assessment/Plan   Tahira was seen today for anxiety.    Diagnoses and all orders for this visit:    Pelvic pain  Comments:  Pt had an U/S today for pelvic pain. She has a small hemorrgahic cyst on the left. Will repeat U/S in 6 weeks.     Anxiety  Comments:  Pt is having a lot of anxiety. Often losing her temper with her children. Wishes to try something mild. Will try Buspar and RTO in 2 weeks for follow up.  Orders:  -     busPIRone (BUSPAR) 5 MG tablet; Take 1 tablet by mouth 3 (Three) Times a Day.         Patient's Body mass index is 24.87 kg/m². BMI is within normal parameters. No follow-up required.      Irais Calvillo, APRN  10/1/2018

## 2018-10-01 NOTE — PATIENT INSTRUCTIONS

## 2018-10-22 ENCOUNTER — OFFICE VISIT (OUTPATIENT)
Dept: OBSTETRICS AND GYNECOLOGY | Facility: CLINIC | Age: 43
End: 2018-10-22

## 2018-10-22 VITALS
BODY MASS INDEX: 25.76 KG/M2 | DIASTOLIC BLOOD PRESSURE: 80 MMHG | HEIGHT: 62 IN | SYSTOLIC BLOOD PRESSURE: 136 MMHG | WEIGHT: 140 LBS

## 2018-10-22 DIAGNOSIS — F41.9 ANXIETY: Primary | ICD-10-CM

## 2018-10-22 PROCEDURE — 99213 OFFICE O/P EST LOW 20 MIN: CPT | Performed by: NURSE PRACTITIONER

## 2018-10-22 NOTE — PROGRESS NOTES
"Ata Dinh is a 43 y.o. female    Pt is here for follow up of anxiety. She is improved with Buspar, but she felt she needed one during the day and it caused drowsiness.       Anxiety   Presents for follow-up visit. Symptoms include excessive worry and nervous/anxious behavior. Patient reports no chest pain, compulsions, confusion, decreased concentration, depressed mood, dizziness, dry mouth, feeling of choking, hyperventilation, impotence, insomnia, irritability, malaise, muscle tension, nausea, obsessions, palpitations, panic, restlessness, shortness of breath or suicidal ideas. Symptoms occur most days. The severity of symptoms is moderate. The quality of sleep is good. Nighttime awakenings: occasional.     Compliance with medications is %.         /80 (BP Location: Left arm, Patient Position: Sitting, Cuff Size: Adult)   Ht 157.5 cm (62\")   Wt 63.5 kg (140 lb)   LMP 10/13/2018 (Exact Date)   Breastfeeding? No   BMI 25.61 kg/m²     Outpatient Encounter Prescriptions as of 10/22/2018   Medication Sig Dispense Refill   • busPIRone (BUSPAR) 5 MG tablet Take 1 tablet by mouth 3 (Three) Times a Day. 90 tablet 3   • Calcium Citrate-Vitamin D (CALCIUM + D PO) Take  by mouth.     • fluticasone (FLONASE) 50 MCG/ACT nasal spray 2 sprays into the nostril(s) as directed by provider Daily. 16 g 5   • loratadine (CLARITIN) 10 MG tablet Take 1 tablet by mouth Daily. 30 tablet 5   • Multiple Vitamins-Minerals (MULTIVITAMIN ADULT PO) Take  by mouth.     • Naproxen Sodium (ALEVE PO) Take  by mouth As Needed (last taken around noon today).     • [DISCONTINUED] MethylPREDNISolone (MEDROL, KORTNEY,) 4 MG tablet Take as directed on package instructions. 21 each 0     No facility-administered encounter medications on file as of 10/22/2018.        Surgical History  Past Surgical History:   Procedure Laterality Date   •  SECTION     • COLON RESECTION      intassuception as an infant.   • WISDOM " TOOTH EXTRACTION         Family History  Family History   Problem Relation Age of Onset   • Leukemia Father    • No Known Problems Mother    • Hypertension Brother    • Breast cancer Neg Hx    • Uterine cancer Neg Hx    • Ovarian cancer Neg Hx    • Colon cancer Neg Hx    • Melanoma Neg Hx    • Prostate cancer Neg Hx        The following portions of the patient's history were reviewed and updated as appropriate: allergies, current medications, past family history, past medical history, past social history, past surgical history and problem list.    Review of Systems   Constitutional: Negative for activity change, appetite change, chills, diaphoresis, fatigue, fever, irritability, unexpected weight gain and unexpected weight loss.   HENT: Negative for congestion, dental problem, drooling, ear discharge, ear pain, facial swelling, hearing loss, mouth sores, nosebleeds, postnasal drip, rhinorrhea, sinus pressure, sneezing, sore throat, tinnitus, trouble swallowing and voice change.    Eyes: Negative for blurred vision, double vision, photophobia, pain, discharge, redness, itching and visual disturbance.   Respiratory: Negative for apnea, cough, choking, chest tightness, shortness of breath, wheezing and stridor.    Cardiovascular: Negative for chest pain, palpitations and leg swelling.   Gastrointestinal: Negative for abdominal distention, abdominal pain, anal bleeding, blood in stool, constipation, diarrhea, nausea, rectal pain, vomiting, GERD and indigestion.   Endocrine: Negative for cold intolerance, heat intolerance, polydipsia, polyphagia and polyuria.   Genitourinary: Negative for breast discharge, urinary incontinence, decreased libido, decreased urine volume, difficulty urinating, dyspareunia, dysuria, flank pain, frequency, genital sores, hematuria, impotence, menstrual problem, pelvic pain, urgency, vaginal bleeding, vaginal discharge, vaginal pain, breast lump and breast pain.   Musculoskeletal: Negative for  arthralgias, back pain, gait problem, joint swelling, myalgias, neck pain, neck stiffness and bursitis.   Skin: Negative for color change, dry skin and rash.   Allergic/Immunologic: Negative for environmental allergies, food allergies and immunocompromised state.   Neurological: Negative for dizziness, tremors, seizures, syncope, facial asymmetry, speech difficulty, weakness, light-headedness, numbness, headache, memory problem and confusion.   Hematological: Negative for adenopathy. Does not bruise/bleed easily.   Psychiatric/Behavioral: Negative for agitation, behavioral problems, decreased concentration, dysphoric mood, hallucinations, self-injury, sleep disturbance, suicidal ideas, negative for hyperactivity, depressed mood and stress. The patient is nervous/anxious. The patient does not have insomnia.        Objective   Physical Exam   Constitutional: She is oriented to person, place, and time. She appears well-developed and well-nourished.   HENT:   Head: Normocephalic and atraumatic.   Eyes: Conjunctivae are normal. Right eye exhibits no discharge. Left eye exhibits no discharge.   Neck: Normal range of motion. Neck supple. No thyromegaly present.   Cardiovascular: Normal rate, regular rhythm and normal heart sounds.    Pulmonary/Chest: Effort normal and breath sounds normal.   Neurological: She is alert and oriented to person, place, and time.   Skin: Skin is warm and dry.   Psychiatric: She has a normal mood and affect. Her behavior is normal. Judgment and thought content normal.   Nursing note and vitals reviewed.      Assessment/Plan   Tahira was seen today for anxiety.    Diagnoses and all orders for this visit:    Anxiety  Comments:  Pt is some better on Buspar, however she tried to take one during the day and it caused drowsiness. She will try taking one and a half at bedtime and see if that works better. We discussed switching her to another medication, but she does not want to at this time. Will RTO in  1 month.    Ovarian Cyst   Pt had an ovarian cyst on her U/S 2 weeks ago. She has another U/S scheduled in 4 weeks. She has not had any pain since she was here.     Patient's Body mass index is 25.61 kg/m². BMI is above normal parameters. Recommendations include: educational material, exercise counseling and nutrition counseling.      Irais Calvillo, APRN  10/22/2018

## 2018-10-22 NOTE — PATIENT INSTRUCTIONS

## 2018-12-19 ENCOUNTER — PROCEDURE VISIT (OUTPATIENT)
Dept: OBSTETRICS AND GYNECOLOGY | Facility: CLINIC | Age: 43
End: 2018-12-19

## 2018-12-19 DIAGNOSIS — R10.2 PELVIC PAIN: Primary | ICD-10-CM

## 2018-12-19 PROCEDURE — 76830 TRANSVAGINAL US NON-OB: CPT | Performed by: OBSTETRICS & GYNECOLOGY

## 2018-12-20 ENCOUNTER — TELEPHONE (OUTPATIENT)
Dept: OBSTETRICS AND GYNECOLOGY | Facility: CLINIC | Age: 43
End: 2018-12-20

## 2018-12-20 NOTE — TELEPHONE ENCOUNTER
Spoke with pt per Irais. Pt was informed that her u/s showed slight improvement and she needs to repeat it in six weeks. Pt verbalized correct understanding.

## 2019-01-10 ENCOUNTER — OFFICE VISIT (OUTPATIENT)
Dept: RETAIL CLINIC | Facility: CLINIC | Age: 44
End: 2019-01-10

## 2019-01-10 VITALS
HEIGHT: 62 IN | TEMPERATURE: 98.1 F | BODY MASS INDEX: 24.84 KG/M2 | OXYGEN SATURATION: 98 % | RESPIRATION RATE: 20 BRPM | HEART RATE: 83 BPM | SYSTOLIC BLOOD PRESSURE: 110 MMHG | DIASTOLIC BLOOD PRESSURE: 80 MMHG | WEIGHT: 135 LBS

## 2019-01-10 DIAGNOSIS — J01.10 ACUTE FRONTAL SINUSITIS, RECURRENCE NOT SPECIFIED: Primary | ICD-10-CM

## 2019-01-10 DIAGNOSIS — J20.9 ACUTE BRONCHITIS, UNSPECIFIED ORGANISM: ICD-10-CM

## 2019-01-10 PROCEDURE — 94640 AIRWAY INHALATION TREATMENT: CPT | Performed by: NURSE PRACTITIONER

## 2019-01-10 PROCEDURE — 99213 OFFICE O/P EST LOW 20 MIN: CPT | Performed by: NURSE PRACTITIONER

## 2019-01-10 RX ORDER — ALBUTEROL SULFATE 2.5 MG/3ML
2.5 SOLUTION RESPIRATORY (INHALATION) EVERY 4 HOURS PRN
Qty: 30 VIAL | Refills: 0 | Status: SHIPPED | OUTPATIENT
Start: 2019-01-10 | End: 2020-03-10

## 2019-01-10 RX ORDER — METHYLPREDNISOLONE 4 MG/1
TABLET ORAL
Qty: 21 EACH | Refills: 0 | Status: SHIPPED | OUTPATIENT
Start: 2019-01-10 | End: 2019-02-04

## 2019-01-10 RX ORDER — ALBUTEROL SULFATE 2.5 MG/3ML
2.5 SOLUTION RESPIRATORY (INHALATION) ONCE
Status: COMPLETED | OUTPATIENT
Start: 2019-01-10 | End: 2019-01-10

## 2019-01-10 RX ORDER — CLARITHROMYCIN 500 MG/1
500 TABLET, COATED ORAL 2 TIMES DAILY
Qty: 20 TABLET | Refills: 0 | Status: SHIPPED | OUTPATIENT
Start: 2019-01-10 | End: 2019-02-04

## 2019-01-10 RX ADMIN — ALBUTEROL SULFATE 2.5 MG: 2.5 SOLUTION RESPIRATORY (INHALATION) at 08:46

## 2019-01-10 NOTE — PATIENT INSTRUCTIONS
Pt advised she has sinusitis and bronchitis.  Instructed pt on biaxin, medrol dosapk, and nebulizer purpose, dosage and frequency.  Take with food to prevent gi upset.  Can cause metal taste in mouth.  Resume flonase.  Start claritin if needed for runny nose.  Offered steroid injection pt declines.  If pt worsens or does not improve in 3 to 4 days follow up with pcp.  If she worsens with high fever, worsening cough, wheezing, chest congestion or chest pain go to ER.

## 2019-01-10 NOTE — PROGRESS NOTES
Ata   Tahira Dinh is a 43 y.o. female who presents to the clinic with: sinusitis      Onset over two weeks with off and on upper respiratory infection s/s.  Getting worse.  Has used otc medications.  Today woke up and worse with pain in face and sinuses.       Sinusitis   This is a new problem. The current episode started 1 to 4 weeks ago. The problem has been gradually worsening since onset. There has been no fever. Her pain is at a severity of 5/10. Associated symptoms include congestion, coughing, ear pain, headaches, a hoarse voice, sinus pressure and a sore throat. Pertinent negatives include no chills, diaphoresis or shortness of breath. Treatments tried: mucinex and nasal spray with ibuprofen  The treatment provided mild relief.        The following portions of the patient's history were reviewed and updated as appropriate: allergies, current medications, past family history, past medical history, past social history, past surgical history and problem list.        Review of Systems   Constitutional: Negative for chills, diaphoresis and fever.   HENT: Positive for congestion, ear pain, hoarse voice, sinus pressure, sinus pain and sore throat. Negative for postnasal drip and rhinorrhea.    Respiratory: Positive for cough. Negative for shortness of breath and wheezing.    Neurological: Positive for headaches.         Objective   Physical Exam   Constitutional: She is oriented to person, place, and time. Vital signs are normal. She appears well-developed and well-nourished.   HENT:   Head: Normocephalic and atraumatic.   Right Ear: Hearing and external ear normal.   Left Ear: Hearing and external ear normal.   Nose: Mucosal edema and rhinorrhea present. Right sinus exhibits frontal sinus tenderness. Right sinus exhibits no maxillary sinus tenderness. Left sinus exhibits frontal sinus tenderness. Left sinus exhibits no maxillary sinus tenderness.   Abad tms only partially visible with dull and cloudy no  light reflex with dried ear wax in canal and on tms   Eyes: Conjunctivae are normal. Pupils are equal, round, and reactive to light.   Neck: Neck supple.   Cardiovascular: Normal rate, regular rhythm, S1 normal, S2 normal and normal heart sounds. Exam reveals no gallop, no S3, no S4 and no friction rub.   No murmur heard.  Pulmonary/Chest: Effort normal. No respiratory distress. She has wheezes. She has no rales.   Breath sounds harsh with expiratory wheezing posteriorly.  After breathing treatment clear with expiratory wheezing posteriorly but less   Lymphadenopathy:     She has no cervical adenopathy.   Neurological: She is alert and oriented to person, place, and time.   Skin: Skin is warm, dry and intact.   Psychiatric: She has a normal mood and affect. Her speech is normal and behavior is normal. Judgment and thought content normal.   Vitals reviewed.        Assessment/Plan   Tahira was seen today for sinusitis.    Diagnoses and all orders for this visit:    Acute frontal sinusitis, recurrence not specified    Acute bronchitis, unspecified organism  -     albuterol (PROVENTIL) nebulizer solution 0.083% 2.5 mg/3mL; Take 2.5 mg by nebulization 1 (One) Time.    Other orders  -     clarithromycin (BIAXIN) 500 MG tablet; Take 1 tablet by mouth 2 (Two) Times a Day.  -     MethylPREDNISolone (MEDROL, KORTNEY,) 4 MG tablet; Take as directed on package instructions.  -     albuterol (PROVENTIL) (2.5 MG/3ML) 0.083% nebulizer solution; Take 2.5 mg by nebulization Every 4 (Four) Hours As Needed for Wheezing.          Pt advised she has sinusitis and bronchitis.  Instructed pt on biaxin, medrol dosapk, and nebulizer purpose, dosage and frequency.  Take with food to prevent gi upset.  Can cause metal taste in mouth.  Resume flonase.  Start claritin if needed for runny nose.  Offered steroid injection pt declines.  If pt worsens or does not improve in 3 to 4 days follow up with pcp.  If she worsens with high fever, worsening cough,  wheezing, chest congestion or chest pain go to ER.

## 2019-02-04 ENCOUNTER — PROCEDURE VISIT (OUTPATIENT)
Dept: OBSTETRICS AND GYNECOLOGY | Facility: CLINIC | Age: 44
End: 2019-02-04

## 2019-02-04 ENCOUNTER — OFFICE VISIT (OUTPATIENT)
Dept: OBSTETRICS AND GYNECOLOGY | Facility: CLINIC | Age: 44
End: 2019-02-04

## 2019-02-04 VITALS
DIASTOLIC BLOOD PRESSURE: 82 MMHG | HEIGHT: 62 IN | WEIGHT: 145 LBS | SYSTOLIC BLOOD PRESSURE: 134 MMHG | BODY MASS INDEX: 26.68 KG/M2

## 2019-02-04 DIAGNOSIS — R10.2 PELVIC PAIN: Primary | ICD-10-CM

## 2019-02-04 DIAGNOSIS — F32.9 REACTIVE DEPRESSION: ICD-10-CM

## 2019-02-04 DIAGNOSIS — N83.202 CYST OF LEFT OVARY: Primary | ICD-10-CM

## 2019-02-04 PROCEDURE — 99213 OFFICE O/P EST LOW 20 MIN: CPT | Performed by: NURSE PRACTITIONER

## 2019-02-04 PROCEDURE — 76830 TRANSVAGINAL US NON-OB: CPT | Performed by: OBSTETRICS & GYNECOLOGY

## 2019-02-04 RX ORDER — BUPROPION HYDROCHLORIDE 150 MG/1
150 TABLET ORAL EVERY MORNING
Qty: 30 TABLET | Refills: 12 | Status: SHIPPED | OUTPATIENT
Start: 2019-02-04 | End: 2020-03-10

## 2019-02-04 NOTE — PROGRESS NOTES
Attempted to obtain health maintenance information, patient unable to provide answers for the following items Tdap, Colonoscopy, Pneumococcal Vaccine, Medicare Annual Wellness Exam, Diabetic Eye Exam, Diabetic Foot Exam, HPV Vaccine, Chlamydia Screening , Influenza Vaccine and Zoster Vaccine.

## 2019-02-04 NOTE — PROGRESS NOTES
"Ata Dinh is a 43 y.o. female    Pt is here for follow up of ovarian cyst and depression. She has mild pelvic pain.      Depression   Visit Type: follow-up  Patient presents with the following symptoms: depressed mood, excessive worry and nervousness/anxiety.  Patient is not experiencing: anhedonia, chest pain, choking sensation, compulsions, confusion, decreased concentration, dizziness, dry mouth, fatigue, feelings of hopelessness, feelings of worthlessness, hypersomnia, hyperventilation, impotence, insomnia, irritability, malaise, memory impairment, muscle tension, nausea, obsessions, palpitations, panic, psychomotor agitation, psychomotor retardation, restlessness, shortness of breath, suicidal ideas, suicidal planning, thoughts of death, weight gain and weight loss.  Frequency of symptoms: most days   Severity: mild   Sleep quality: good  Nighttime awakenings: occasional  Compliance with medications:  %        Ovarian Cyst   This is a recurrent problem. The current episode started more than 1 month ago. The problem occurs daily. The problem has been unchanged. Pertinent negatives include no abdominal pain, anorexia, arthralgias, change in bowel habit, chest pain, chills, congestion, coughing, diaphoresis, fatigue, fever, headaches, joint swelling, myalgias, nausea, neck pain, numbness, rash, sore throat, swollen glands, urinary symptoms, vertigo, visual change, vomiting or weakness. Nothing aggravates the symptoms. She has tried nothing for the symptoms.         /82   Ht 157.5 cm (62\")   Wt 65.8 kg (145 lb)   LMP 01/13/2019 (Exact Date)   Breastfeeding? No   BMI 26.52 kg/m²     Outpatient Encounter Medications as of 2/4/2019   Medication Sig Dispense Refill   • Calcium Citrate-Vitamin D (CALCIUM + D PO) Take  by mouth.     • Multiple Vitamins-Minerals (MULTIVITAMIN ADULT PO) Take  by mouth.     • Naproxen Sodium (ALEVE PO) Take  by mouth As Needed (last taken around noon " today).     • albuterol (PROVENTIL) (2.5 MG/3ML) 0.083% nebulizer solution Take 2.5 mg by nebulization Every 4 (Four) Hours As Needed for Wheezing. 30 vial 0   • buPROPion XL (WELLBUTRIN XL) 150 MG 24 hr tablet Take 1 tablet by mouth Every Morning. 30 tablet 12   • [DISCONTINUED] busPIRone (BUSPAR) 5 MG tablet Take 1 tablet by mouth 3 (Three) Times a Day. 90 tablet 3   • [DISCONTINUED] clarithromycin (BIAXIN) 500 MG tablet Take 1 tablet by mouth 2 (Two) Times a Day. 20 tablet 0   • [DISCONTINUED] fluticasone (FLONASE) 50 MCG/ACT nasal spray 2 sprays into the nostril(s) as directed by provider Daily. 16 g 5   • [DISCONTINUED] loratadine (CLARITIN) 10 MG tablet Take 1 tablet by mouth Daily. 30 tablet 5   • [DISCONTINUED] MethylPREDNISolone (MEDROL, KORTNEY,) 4 MG tablet Take as directed on package instructions. 21 each 0     No facility-administered encounter medications on file as of 2019.        Surgical History  Past Surgical History:   Procedure Laterality Date   •  SECTION     • COLON RESECTION      intassuception as an infant.   • WISDOM TOOTH EXTRACTION         Family History  Family History   Problem Relation Age of Onset   • Leukemia Father    • No Known Problems Mother    • Hypertension Brother    • Breast cancer Neg Hx    • Uterine cancer Neg Hx    • Ovarian cancer Neg Hx    • Colon cancer Neg Hx    • Melanoma Neg Hx    • Prostate cancer Neg Hx        The following portions of the patient's history were reviewed and updated as appropriate: allergies, current medications, past family history, past medical history, past social history, past surgical history and problem list.    Review of Systems   Constitutional: Negative for activity change, appetite change, chills, diaphoresis, fatigue, fever, irritability, unexpected weight gain and unexpected weight loss.   HENT: Negative for congestion, dental problem, drooling, ear discharge, ear pain, facial swelling, hearing loss, mouth sores, nosebleeds,  postnasal drip, rhinorrhea, sinus pressure, sneezing, sore throat, tinnitus, trouble swallowing and voice change.    Eyes: Negative for blurred vision, double vision, photophobia, pain, discharge, redness, itching and visual disturbance.   Respiratory: Negative for apnea, cough, choking, chest tightness, shortness of breath, wheezing and stridor.    Cardiovascular: Negative for chest pain, palpitations and leg swelling.   Gastrointestinal: Negative for abdominal distention, abdominal pain, anal bleeding, anorexia, blood in stool, change in bowel habit, constipation, diarrhea, nausea, rectal pain, vomiting, GERD and indigestion.   Endocrine: Negative for cold intolerance, heat intolerance, polydipsia, polyphagia and polyuria.   Genitourinary: Positive for pelvic pain. Negative for breast discharge, urinary incontinence, decreased libido, decreased urine volume, difficulty urinating, dyspareunia, dysuria, flank pain, frequency, genital sores, hematuria, impotence, menstrual problem, urgency, vaginal bleeding, vaginal discharge, vaginal pain, breast lump and breast pain.   Musculoskeletal: Negative for arthralgias, back pain, gait problem, joint swelling, myalgias, neck pain, neck stiffness and bursitis.   Skin: Negative for color change, dry skin and rash.   Allergic/Immunologic: Negative for environmental allergies, food allergies and immunocompromised state.   Neurological: Negative for dizziness, vertigo, tremors, seizures, syncope, facial asymmetry, speech difficulty, weakness, light-headedness, numbness, headache, memory problem and confusion.   Hematological: Negative for adenopathy. Does not bruise/bleed easily.   Psychiatric/Behavioral: Positive for depressed mood. Negative for agitation, behavioral problems, decreased concentration, dysphoric mood, hallucinations, self-injury, sleep disturbance, suicidal ideas, negative for hyperactivity and stress. The patient is nervous/anxious. The patient does not have  insomnia.        Objective   Physical Exam   Constitutional: She is oriented to person, place, and time. She appears well-developed and well-nourished.   HENT:   Head: Normocephalic and atraumatic.   Eyes: Conjunctivae are normal. Right eye exhibits no discharge. Left eye exhibits no discharge.   Neck: Normal range of motion. Neck supple. No thyromegaly present.   Cardiovascular: Normal rate, regular rhythm and normal heart sounds.   Pulmonary/Chest: Effort normal and breath sounds normal.   Neurological: She is alert and oriented to person, place, and time.   Skin: Skin is warm and dry.   Psychiatric: She has a normal mood and affect. Her behavior is normal. Judgment and thought content normal.   Nursing note and vitals reviewed.      Assessment/Plan   Tahira was seen today for pelvic pain.    Diagnoses and all orders for this visit:    Cyst of left ovary  Comments:  Pt has a complex cyst on her left ovary. it is the same size as her last U/S, but now has the appearance of an endometrioma. Will RTO to see Dr. Franks.    Reactive depression  Comments:  pt has been having some mild depression and anxiety. She had tried Buspar and it did not help. Will try Wellbutrin.   Orders:  -     buPROPion XL (WELLBUTRIN XL) 150 MG 24 hr tablet; Take 1 tablet by mouth Every Morning.         Patient's Body mass index is 26.52 kg/m². BMI is above normal parameters. Recommendations include: educational material, exercise counseling and nutrition counseling.      Irais Calvillo, APRN  2/4/2019

## 2019-02-04 NOTE — PATIENT INSTRUCTIONS

## 2019-02-15 ENCOUNTER — OFFICE VISIT (OUTPATIENT)
Dept: OBSTETRICS AND GYNECOLOGY | Facility: CLINIC | Age: 44
End: 2019-02-15

## 2019-02-15 VITALS
BODY MASS INDEX: 25.95 KG/M2 | DIASTOLIC BLOOD PRESSURE: 80 MMHG | WEIGHT: 141 LBS | HEIGHT: 62 IN | SYSTOLIC BLOOD PRESSURE: 120 MMHG

## 2019-02-15 DIAGNOSIS — N83.202 CYST OF LEFT OVARY: Primary | ICD-10-CM

## 2019-02-15 DIAGNOSIS — R10.2 PELVIC PAIN: ICD-10-CM

## 2019-02-15 DIAGNOSIS — Z78.9 NON-SMOKER: ICD-10-CM

## 2019-02-15 PROCEDURE — 99213 OFFICE O/P EST LOW 20 MIN: CPT | Performed by: OBSTETRICS & GYNECOLOGY

## 2019-02-15 NOTE — PROGRESS NOTES
Subjective   Tahira Dinh is a 43 y.o. female is here today for follow-up.    Patient presents today secondary to an ovarian cyst.    History of Present Illness     43-year-old para 2, 2 previous  sections.  Ultrasounds over the past several months have shown a stable appearing hemorrhagic versus endometrioma cyst on the left ovary.  It measures less than 4 cm.  Previous office notes and ultrasounds are reviewed.  She occasionally has pelvic pain which is not significant or bothersome.  She has no significant dyspareunia.  Her menses are not bothersome.    The following portions of the patient's history were reviewed and updated as appropriate: allergies, current medications, past family history, past medical history, past social history, past surgical history and problem list.    Review of Systems   All other systems reviewed and are negative.      Objective   Physical Exam   Constitutional: She is oriented to person, place, and time. She appears well-developed and well-nourished.   HENT:   Head: Normocephalic and atraumatic.   Neck: Normal range of motion. Neck supple.   Neurological: She is alert and oriented to person, place, and time.   Skin: Skin is warm and dry.   Psychiatric: She has a normal mood and affect. Her behavior is normal. Judgment and thought content normal.   Nursing note and vitals reviewed.        Assessment/Plan   Tahira was seen today for ovarian cyst.    Diagnoses and all orders for this visit:    Cyst of left ovary    Pelvic pain  Comments:  Mild and infrequent.  In addition, not consistent with the side that the cyst is on.    Non-smoker    Given lack of significant symptoms, would expectantly manage.  Call for worsening symptoms.  Return office in 6 months for annual exam and repeat ultrasound.    Samuel Franks MD

## 2019-06-13 ENCOUNTER — TELEPHONE (OUTPATIENT)
Dept: OBSTETRICS AND GYNECOLOGY | Facility: CLINIC | Age: 44
End: 2019-06-13

## 2019-06-13 NOTE — TELEPHONE ENCOUNTER
PT HAS SEVERAL LABS ORDERED ON 7/17/18.  IF PROVIDER STILL WANTS HER TO HAVE THIS DONE LET PT KNOW IF NOT LET ME KNOW AND I CAN DELETE ORDER.THANKS BC

## 2019-08-15 ENCOUNTER — PROCEDURE VISIT (OUTPATIENT)
Dept: OBSTETRICS AND GYNECOLOGY | Facility: CLINIC | Age: 44
End: 2019-08-15

## 2019-08-15 DIAGNOSIS — N83.202 CYST OF LEFT OVARY: Primary | ICD-10-CM

## 2019-08-15 PROCEDURE — 76830 TRANSVAGINAL US NON-OB: CPT | Performed by: OBSTETRICS & GYNECOLOGY

## 2019-08-21 ENCOUNTER — OFFICE VISIT (OUTPATIENT)
Dept: OBSTETRICS AND GYNECOLOGY | Facility: CLINIC | Age: 44
End: 2019-08-21

## 2019-08-21 VITALS
WEIGHT: 138 LBS | DIASTOLIC BLOOD PRESSURE: 94 MMHG | SYSTOLIC BLOOD PRESSURE: 118 MMHG | BODY MASS INDEX: 25.4 KG/M2 | HEIGHT: 62 IN

## 2019-08-21 DIAGNOSIS — Z01.419 WELL WOMAN EXAM WITH ROUTINE GYNECOLOGICAL EXAM: Primary | ICD-10-CM

## 2019-08-21 DIAGNOSIS — Z12.31 ENCOUNTER FOR SCREENING MAMMOGRAM FOR BREAST CANCER: ICD-10-CM

## 2019-08-21 DIAGNOSIS — N89.8 VAGINAL DISCHARGE: ICD-10-CM

## 2019-08-21 DIAGNOSIS — N60.02 SOLITARY CYST OF LEFT BREAST: ICD-10-CM

## 2019-08-21 DIAGNOSIS — F41.9 ANXIETY: ICD-10-CM

## 2019-08-21 DIAGNOSIS — N83.202 CYST OF LEFT OVARY: ICD-10-CM

## 2019-08-21 PROCEDURE — 87481 CANDIDA DNA AMP PROBE: CPT | Performed by: NURSE PRACTITIONER

## 2019-08-21 PROCEDURE — 87798 DETECT AGENT NOS DNA AMP: CPT | Performed by: NURSE PRACTITIONER

## 2019-08-21 PROCEDURE — 87661 TRICHOMONAS VAGINALIS AMPLIF: CPT | Performed by: NURSE PRACTITIONER

## 2019-08-21 PROCEDURE — 87624 HPV HI-RISK TYP POOLED RSLT: CPT | Performed by: NURSE PRACTITIONER

## 2019-08-21 PROCEDURE — 99396 PREV VISIT EST AGE 40-64: CPT | Performed by: NURSE PRACTITIONER

## 2019-08-21 PROCEDURE — 87512 GARDNER VAG DNA QUANT: CPT | Performed by: NURSE PRACTITIONER

## 2019-08-21 PROCEDURE — G0123 SCREEN CERV/VAG THIN LAYER: HCPCS | Performed by: NURSE PRACTITIONER

## 2019-08-21 RX ORDER — ALPRAZOLAM 0.25 MG/1
0.25 TABLET ORAL 3 TIMES DAILY PRN
Qty: 90 TABLET | Refills: 0 | Status: SHIPPED | OUTPATIENT
Start: 2019-08-21 | End: 2020-03-10

## 2019-08-21 RX ORDER — FLUCONAZOLE 150 MG/1
150 TABLET ORAL ONCE
Qty: 2 TABLET | Refills: 0 | Status: SHIPPED | OUTPATIENT
Start: 2019-08-21 | End: 2019-08-21

## 2019-08-21 NOTE — PROGRESS NOTES
"Ata Dinh is a 43 y.o. female    Patient is here today for yearly checkup and follow-up of ovarian cyst.  She has not had any pain from the cyst at all.  Also complains of anxiety her mother is getting ready to have surgery and she would like some Xanax to take as needed as they have to travel out of town.  She also feels that she has a yeast infection.      Gynecologic Exam   The patient's primary symptoms include vaginal discharge. The patient's pertinent negatives include no pelvic pain or vaginal bleeding. The patient is experiencing no pain. Pertinent negatives include no abdominal pain, anorexia, back pain, chills, constipation, diarrhea, discolored urine, dysuria, fever, flank pain, frequency, headaches, hematuria, joint pain, joint swelling, nausea, painful intercourse, rash, sore throat, urgency or vomiting. She is sexually active. She uses condoms for contraception. Her menstrual history has been regular.         /94   Ht 157.5 cm (62\")   Wt 62.6 kg (138 lb)   LMP 08/06/2019 (Exact Date)   Breastfeeding? No   BMI 25.24 kg/m²     Outpatient Encounter Medications as of 8/21/2019   Medication Sig Dispense Refill   • Calcium Citrate-Vitamin D (CALCIUM + D PO) Take  by mouth.     • Multiple Vitamins-Minerals (MULTIVITAMIN ADULT PO) Take  by mouth.     • Naproxen Sodium (ALEVE PO) Take  by mouth As Needed (last taken around noon today).     • albuterol (PROVENTIL) (2.5 MG/3ML) 0.083% nebulizer solution Take 2.5 mg by nebulization Every 4 (Four) Hours As Needed for Wheezing. 30 vial 0   • ALPRAZolam (XANAX) 0.25 MG tablet Take 1 tablet by mouth 3 (Three) Times a Day As Needed for Anxiety. 90 tablet 0   • buPROPion XL (WELLBUTRIN XL) 150 MG 24 hr tablet Take 1 tablet by mouth Every Morning. 30 tablet 12   • fluconazole (DIFLUCAN) 150 MG tablet Take 1 tablet by mouth 1 (One) Time for 1 dose. Take once a week for 2 weeks. 2 tablet 0     No facility-administered encounter medications on " file as of 2019.        Surgical History  Past Surgical History:   Procedure Laterality Date   •  SECTION     • COLON RESECTION      intassuception as an infant.   • WISDOM TOOTH EXTRACTION         Family History  Family History   Problem Relation Age of Onset   • Leukemia Father    • Heart disease Mother    • Hypertension Brother    • Breast cancer Neg Hx    • Uterine cancer Neg Hx    • Ovarian cancer Neg Hx    • Colon cancer Neg Hx    • Melanoma Neg Hx    • Prostate cancer Neg Hx        The following portions of the patient's history were reviewed and updated as appropriate: allergies, current medications, past family history, past medical history, past social history, past surgical history and problem list.    Review of Systems   Constitutional: Negative for activity change, appetite change, chills, diaphoresis, fatigue, fever, unexpected weight gain and unexpected weight loss.   HENT: Negative for congestion, dental problem, drooling, ear discharge, ear pain, facial swelling, hearing loss, mouth sores, nosebleeds, postnasal drip, rhinorrhea, sinus pressure, sneezing, sore throat, swollen glands, tinnitus, trouble swallowing and voice change.    Eyes: Negative for blurred vision, double vision, photophobia, pain, discharge, redness, itching and visual disturbance.   Respiratory: Negative for apnea, cough, choking, chest tightness, shortness of breath, wheezing and stridor.    Cardiovascular: Negative for chest pain, palpitations and leg swelling.   Gastrointestinal: Negative for abdominal distention, abdominal pain, anal bleeding, anorexia, blood in stool, constipation, diarrhea, nausea, rectal pain, vomiting, GERD and indigestion.   Endocrine: Negative for cold intolerance, heat intolerance, polydipsia, polyphagia and polyuria.   Genitourinary: Positive for vaginal discharge. Negative for amenorrhea, breast discharge, breast lump, breast pain, decreased libido, decreased urine volume, difficulty  urinating, dyspareunia, dysuria, flank pain, frequency, genital sores, hematuria, menstrual problem, pelvic pain, pelvic pressure, urgency, urinary incontinence, vaginal bleeding and vaginal pain.   Musculoskeletal: Negative for arthralgias, back pain, gait problem, joint pain, joint swelling, myalgias, neck pain, neck stiffness and bursitis.   Skin: Negative for color change, dry skin and rash.   Allergic/Immunologic: Negative for environmental allergies, food allergies and immunocompromised state.   Neurological: Negative for dizziness, tremors, seizures, syncope, facial asymmetry, speech difficulty, weakness, light-headedness, numbness, headache, memory problem and confusion.   Hematological: Negative for adenopathy. Does not bruise/bleed easily.   Psychiatric/Behavioral: Negative for agitation, behavioral problems, decreased concentration, dysphoric mood, hallucinations, self-injury, sleep disturbance, suicidal ideas, negative for hyperactivity, depressed mood and stress. The patient is nervous/anxious.        Objective   Physical Exam   Constitutional: She is oriented to person, place, and time. She appears well-developed and well-nourished. No distress.   HENT:   Head: Normocephalic.   Right Ear: External ear normal.   Left Ear: External ear normal.   Nose: Nose normal.   Mouth/Throat: Oropharynx is clear and moist.   Eyes: Conjunctivae are normal. Right eye exhibits no discharge. Left eye exhibits no discharge. No scleral icterus.   Neck: Normal range of motion. Neck supple. Carotid bruit is not present. No tracheal deviation present. No thyromegaly present.   Cardiovascular: Normal rate, regular rhythm, normal heart sounds and intact distal pulses.   No murmur heard.  Pulmonary/Chest: Effort normal and breath sounds normal. No respiratory distress. She has no wheezes. Right breast exhibits no inverted nipple, no mass, no nipple discharge, no skin change and no tenderness. Left breast exhibits no inverted  nipple, no mass, no nipple discharge, no skin change and no tenderness. Breasts are symmetrical. There is no breast swelling.       Abdominal: Soft. She exhibits no distension and no mass. There is no tenderness. There is no guarding. No hernia. Hernia confirmed negative in the right inguinal area and confirmed negative in the left inguinal area.   Genitourinary: Rectum normal and uterus normal. Rectal exam shows no mass. No breast tenderness, discharge or bleeding. Pelvic exam was performed with patient supine. There is no rash, tenderness, lesion or injury on the right labia. There is no rash, tenderness, lesion or injury on the left labia. Uterus is not enlarged, not fixed and not tender. Cervix exhibits no motion tenderness, no discharge and no friability. Right adnexum displays no mass, no tenderness and no fullness. Left adnexum displays tenderness. Left adnexum displays no mass and no fullness. No erythema, tenderness or bleeding in the vagina. No foreign body in the vagina. No signs of injury around the vagina. Vaginal discharge found.   Genitourinary Comments:   BSU normal  Urethral meatus  Normal  Perineum  Normal   Musculoskeletal: Normal range of motion. She exhibits no edema or tenderness.   Lymphadenopathy:        Head (right side): No submental, no submandibular, no tonsillar, no preauricular, no posterior auricular and no occipital adenopathy present.        Head (left side): No submental, no submandibular, no tonsillar, no preauricular, no posterior auricular and no occipital adenopathy present.     She has no cervical adenopathy.        Right cervical: No superficial cervical, no deep cervical and no posterior cervical adenopathy present.       Left cervical: No superficial cervical, no deep cervical and no posterior cervical adenopathy present.     She has no axillary adenopathy.        Right: No inguinal adenopathy present.        Left: No inguinal adenopathy present.   Neurological: She is alert  and oriented to person, place, and time. Coordination normal.   Skin: Skin is warm and dry. No bruising and no rash noted. She is not diaphoretic. No erythema.   Psychiatric: She has a normal mood and affect. Her behavior is normal. Judgment and thought content normal.   Nursing note and vitals reviewed.      Assessment/Plan   Tahira was seen today for gynecologic exam and gynecologic exam.    Diagnoses and all orders for this visit:    Well woman exam with routine gynecological exam  Normal GYN exam. Will have lab work today. Encouraged SBE, pt is aware how to do self breast exam and the importance of same. Discussed weight management and importance of maintaining a healthy weight. Discussed Vitamin D intake and the importance of adequate vitamin D for both Bone Health and a healthy immune system.  Discussed Daily exercise and the importance of same, in regards to a healthy heart as well as helping to maintain her weight and improving her mental health.  BMI 25.2.  Mammogram will be scheduled at [facility] per her request.  Pap smear is done per [facility] guidelines.  -     Cancel: Liquid-based Pap Smear, Screening  -     CBC & Differential  -     Comprehensive Metabolic Panel  -     Lipid Panel With LDL / HDL Ratio  -     TSH  -     T3, Uptake  -     Vitamin D 25 Hydroxy  -     T4, Free  -     Hemoglobin A1c  -     UA / M With / Rflx Culture([facility] ONLY) - Urine, Clean Catch  -     Liquid-based Pap Smear, Screening    Encounter for screening mammogram for breast cancer  Comments:  Patient will have mammogram at [facility] per her request.  Orders:  -     Mammo Screening Digital Tomosynthesis Bilateral With CAD; Future    Cyst of left ovary  Comments:  Patient has a complex cyst on the left ovary 3.5 cm it has remained stable for a year.  It does have the appearance of an endometrioma.  Patient is not having any pain at this time.  She is seeing Dr. Franks for evaluation 6 months ago and he wanted to just watch it unless it  starts growing or she has pain.  So we will repeat the ultrasound again in 6 months.    Solitary cyst of left breast  Comments:  Patient has a 3 to 4 cm mobile cyst in the left breast at 3:00.  She has a mammogram scheduled at [facility] and states they often do an ultrasound and the cyst has remained stable over the last 2 mammograms.    Vaginal discharge  Comments:  She has a small amount of white discharge.  BV panel is added to Pap smear.  Diflucan is given today.  Orders:  -     Liquid-based Pap Smear, Screening  -     fluconazole (DIFLUCAN) 150 MG tablet; Take 1 tablet by mouth 1 (One) Time for 1 dose. Take once a week for 2 weeks.    Anxiety  Comments:  Patient is given Xanax 0.25 to take as needed 3 times a day.  B[place]den is obtained.  Patient has not been taking her Wellbutrin she is not sure she is going to restart it.  If she does she will call for prescription.  Orders:  -     ALPRAZolam (XANAX) 0.25 MG tablet; Take 1 tablet by mouth 3 (Three) Times a Day As Needed for Anxiety.         Patient's Body mass index is 25.24 kg/m². BMI is above normal parameters. Recommendations include: educational material, exercise counseling and nutrition counseling.      Irais Calvillo, APRN  8/21/2019

## 2019-08-22 LAB
25(OH)D3+25(OH)D2 SERPL-MCNC: 44 NG/ML (ref 30–100)
ALBUMIN SERPL-MCNC: 4.8 G/DL (ref 3.5–5.2)
ALBUMIN/GLOB SERPL: 2.2 G/DL
ALP SERPL-CCNC: 94 U/L (ref 39–117)
ALT SERPL-CCNC: 18 U/L (ref 1–33)
AST SERPL-CCNC: 24 U/L (ref 1–32)
BASOPHILS # BLD AUTO: 0.03 10*3/MM3 (ref 0–0.2)
BASOPHILS NFR BLD AUTO: 0.6 % (ref 0–1.5)
BILIRUB SERPL-MCNC: 0.4 MG/DL (ref 0.2–1.2)
BUN SERPL-MCNC: 8 MG/DL (ref 6–20)
BUN/CREAT SERPL: 12.1 (ref 7–25)
CALCIUM SERPL-MCNC: 9.5 MG/DL (ref 8.6–10.5)
CHLORIDE SERPL-SCNC: 100 MMOL/L (ref 98–107)
CHOLEST SERPL-MCNC: 145 MG/DL (ref 0–200)
CO2 SERPL-SCNC: 23.7 MMOL/L (ref 22–29)
CREAT SERPL-MCNC: 0.66 MG/DL (ref 0.57–1)
EOSINOPHIL # BLD AUTO: 0.11 10*3/MM3 (ref 0–0.4)
EOSINOPHIL NFR BLD AUTO: 2.3 % (ref 0.3–6.2)
ERYTHROCYTE [DISTWIDTH] IN BLOOD BY AUTOMATED COUNT: 12.9 % (ref 12.3–15.4)
GLOBULIN SER CALC-MCNC: 2.2 GM/DL
GLUCOSE SERPL-MCNC: 93 MG/DL (ref 65–99)
HBA1C MFR BLD: 4.9 % (ref 4.8–5.6)
HCT VFR BLD AUTO: 44.6 % (ref 34–46.6)
HDLC SERPL-MCNC: 51 MG/DL (ref 40–60)
HGB BLD-MCNC: 13.7 G/DL (ref 12–15.9)
IMM GRANULOCYTES # BLD AUTO: 0.01 10*3/MM3 (ref 0–0.05)
IMM GRANULOCYTES NFR BLD AUTO: 0.2 % (ref 0–0.5)
LDLC SERPL CALC-MCNC: 66 MG/DL (ref 0–100)
LDLC/HDLC SERPL: 1.3 {RATIO}
LYMPHOCYTES # BLD AUTO: 1.62 10*3/MM3 (ref 0.7–3.1)
LYMPHOCYTES NFR BLD AUTO: 34 % (ref 19.6–45.3)
MCH RBC QN AUTO: 29.6 PG (ref 26.6–33)
MCHC RBC AUTO-ENTMCNC: 30.7 G/DL (ref 31.5–35.7)
MCV RBC AUTO: 96.3 FL (ref 79–97)
MONOCYTES # BLD AUTO: 0.49 10*3/MM3 (ref 0.1–0.9)
MONOCYTES NFR BLD AUTO: 10.3 % (ref 5–12)
NEUTROPHILS # BLD AUTO: 2.5 10*3/MM3 (ref 1.7–7)
NEUTROPHILS NFR BLD AUTO: 52.6 % (ref 42.7–76)
NRBC BLD AUTO-RTO: 0 /100 WBC (ref 0–0.2)
PLATELET # BLD AUTO: 303 10*3/MM3 (ref 140–450)
POTASSIUM SERPL-SCNC: 5 MMOL/L (ref 3.5–5.2)
PROT SERPL-MCNC: 7 G/DL (ref 6–8.5)
RBC # BLD AUTO: 4.63 10*6/MM3 (ref 3.77–5.28)
SODIUM SERPL-SCNC: 141 MMOL/L (ref 136–145)
T3RU NFR SERPL: 30 % (ref 24–39)
T4 FREE SERPL-MCNC: 1.31 NG/DL (ref 0.93–1.7)
TRIGL SERPL-MCNC: 138 MG/DL (ref 0–150)
TSH SERPL DL<=0.005 MIU/L-ACNC: 2.71 MIU/ML (ref 0.27–4.2)
VLDLC SERPL CALC-MCNC: 27.6 MG/DL
WBC # BLD AUTO: 4.76 10*3/MM3 (ref 3.4–10.8)

## 2019-08-23 ENCOUNTER — TELEPHONE (OUTPATIENT)
Dept: OBSTETRICS AND GYNECOLOGY | Facility: CLINIC | Age: 44
End: 2019-08-23

## 2019-08-23 LAB
HPV I/H RISK 4 DNA CVX QL PROBE+SIG AMP: NOT DETECTED
TRICHOMONAS VAGINALIS PCR: NOT DETECTED

## 2019-08-26 LAB
GEN CATEG CVX/VAG CYTO-IMP: NORMAL
LAB AP CASE REPORT: NORMAL
LAB AP GYN ADDITIONAL INFORMATION: NORMAL
PATH INTERP SPEC-IMP: NORMAL
STAT OF ADQ CVX/VAG CYTO-IMP: NORMAL

## 2019-09-12 DIAGNOSIS — Z12.31 ENCOUNTER FOR SCREENING MAMMOGRAM FOR BREAST CANCER: ICD-10-CM

## 2019-09-21 ENCOUNTER — OFFICE VISIT (OUTPATIENT)
Dept: RETAIL CLINIC | Facility: CLINIC | Age: 44
End: 2019-09-21

## 2019-09-21 VITALS
OXYGEN SATURATION: 98 % | BODY MASS INDEX: 25.61 KG/M2 | RESPIRATION RATE: 14 BRPM | DIASTOLIC BLOOD PRESSURE: 82 MMHG | HEART RATE: 86 BPM | TEMPERATURE: 98.2 F | SYSTOLIC BLOOD PRESSURE: 129 MMHG | WEIGHT: 140 LBS

## 2019-09-21 DIAGNOSIS — H91.92 HEARING LOSS OF LEFT EAR, UNSPECIFIED HEARING LOSS TYPE: ICD-10-CM

## 2019-09-21 DIAGNOSIS — H61.23 EXCESSIVE CERUMEN IN EAR CANAL, BILATERAL: Primary | ICD-10-CM

## 2019-09-21 DIAGNOSIS — H69.82 EUSTACHIAN TUBE DYSFUNCTION, LEFT: ICD-10-CM

## 2019-09-21 PROCEDURE — 99213 OFFICE O/P EST LOW 20 MIN: CPT | Performed by: NURSE PRACTITIONER

## 2019-09-21 RX ORDER — METHYLPREDNISOLONE 4 MG/1
TABLET ORAL
Qty: 1 EACH | Refills: 0 | Status: SHIPPED | OUTPATIENT
Start: 2019-09-21 | End: 2019-11-08

## 2019-09-21 NOTE — PROGRESS NOTES
"Ata Dinh is a 43 y.o. female.     Ear Fullness    There is pain in the left ear. This is a new problem. The current episode started in the past 7 days. The problem occurs constantly. The problem has been unchanged. The patient is experiencing no pain. Associated symptoms include hearing loss. Pertinent negatives include no coughing, headaches, neck pain or sore throat. Associated symptoms comments: Recent head cold. Treatments tried: debrox. The treatment provided no relief.        The following portions of the patient's history were reviewed and updated as appropriate: allergies, current medications, past family history, past medical history, past social history, past surgical history and problem list.    Review of Systems   HENT: Positive for congestion (\"head cold this past week\"), hearing loss and postnasal drip. Negative for ear pain, sinus pressure, sore throat and tinnitus.    Respiratory: Negative for cough.    Cardiovascular: Negative for chest pain.   Gastrointestinal: Negative for nausea.   Musculoskeletal: Negative for neck pain and neck stiffness.   Neurological: Positive for dizziness. Negative for headache.       Objective      /82   Pulse 86   Temp 98.2 °F (36.8 °C)   Resp 14   Wt 63.5 kg (140 lb)   SpO2 98%   BMI 25.61 kg/m²     Physical Exam   Constitutional: She is oriented to person, place, and time. She appears well-developed and well-nourished. No distress.   HENT:   Head: Normocephalic and atraumatic.   Right Ear: Tympanic membrane and external ear normal. No decreased hearing is noted.   Left Ear: Tympanic membrane and external ear normal. Decreased hearing (as compared to right) is noted.   Minimal cerumen to EACs; ears irrigated antil clear; I did explain to patient that I do not think her decreased hearing is coming from cerumen buildup   Eyes: Conjunctivae and EOM are normal. Pupils are equal, round, and reactive to light.   Neck: Normal range of motion. " Neck supple.   Cardiovascular: Normal rate and regular rhythm.   Pulmonary/Chest: Effort normal and breath sounds normal.   Musculoskeletal: Normal range of motion.   Neurological: She is alert and oriented to person, place, and time. She has normal strength. No cranial nerve deficit. Coordination and gait normal.   Skin: Skin is warm and dry. Capillary refill takes less than 2 seconds.   Psychiatric: She has a normal mood and affect. Her behavior is normal. Judgment and thought content normal.   Nursing note and vitals reviewed.        Assessment/Plan   Tahira was seen today for ear fullness.    Diagnoses and all orders for this visit:    Excessive cerumen in ear canal, bilateral    Hearing loss of left ear, unspecified hearing loss type    Eustachian tube dysfunction, left    Other orders  -     methylPREDNISolone (MEDROL, KORTNEY,) 4 MG tablet; Take as directed on package instructions.    Begin flonase, mucinex and sudafed as discussed.  Return to see your Primary Care Provider if not improved in 1 week.    EDWIN Max

## 2019-10-27 ENCOUNTER — IMMUNIZATION (OUTPATIENT)
Dept: RETAIL CLINIC | Facility: CLINIC | Age: 44
End: 2019-10-27

## 2019-10-27 DIAGNOSIS — Z23 NEED FOR VACCINATION: Primary | ICD-10-CM

## 2019-10-27 PROCEDURE — 90471 IMMUNIZATION ADMIN: CPT | Performed by: NURSE PRACTITIONER

## 2019-10-27 PROCEDURE — 90714 TD VACC NO PRESV 7 YRS+ IM: CPT | Performed by: NURSE PRACTITIONER

## 2019-10-27 NOTE — PROGRESS NOTES
Patient presents requesting tetanus shot.  Plain Tetanus toxoid not available.  Discussed Td and she agrees.  Denies having problems with vaccinations in the past. No allergies. Vaccine given. See scanned [facility] documents and administration notes.  She tolerated injection well. She was observed for 15 minutes and showed no evidence of adverse reaction.

## 2019-11-08 ENCOUNTER — IMMUNIZATION (OUTPATIENT)
Dept: RETAIL CLINIC | Facility: CLINIC | Age: 44
End: 2019-11-08

## 2019-11-08 VITALS — TEMPERATURE: 97.7 F

## 2019-11-08 DIAGNOSIS — Z28.39 IMMUNIZATION DEFICIENCY: Primary | ICD-10-CM

## 2019-11-08 PROCEDURE — 90471 IMMUNIZATION ADMIN: CPT | Performed by: NURSE PRACTITIONER

## 2019-11-08 PROCEDURE — 90686 IIV4 VACC NO PRSV 0.5 ML IM: CPT | Performed by: NURSE PRACTITIONER

## 2019-11-08 NOTE — PATIENT INSTRUCTIONS
[facility] private flu shot consent completed.  Flu shot to be billed to insurance.  Follow up as needed.

## 2019-11-08 NOTE — PROGRESS NOTES
Tahira Dinh is a 44 y.o. female who presents to the clinic for a flu shot. No fever or illness today. No contraindications for flu vaccine. Tahira filled out questionnaire and signed consent.    History of Present Illness No illness or fever    The following portions of the patient's history were reviewed and updated as appropriate: allergies, current medications, past family history, past medical history, past social history, past surgical history and problem list.        Review of Systems   Constitutional: Negative for fever.         Objective   Physical Exam   Constitutional: She is oriented to person, place, and time. Vital signs are normal. She appears well-developed and well-nourished.   HENT:   Head: Normocephalic and atraumatic.   Eyes: Pupils are equal, round, and reactive to light.   Neck: Neck supple.   Neurological: She is alert and oriented to person, place, and time.   Skin: Skin is warm, dry and intact.   Psychiatric: She has a normal mood and affect. Her speech is normal and behavior is normal. Judgment and thought content normal.         Assessment/Plan   Influenza vaccination    [facility] private flu shot consent completed.  Flu shot to be billed to insurance.  Follow up as needed.

## 2020-03-10 ENCOUNTER — OFFICE VISIT (OUTPATIENT)
Dept: OBSTETRICS AND GYNECOLOGY | Facility: CLINIC | Age: 45
End: 2020-03-10

## 2020-03-10 VITALS
HEIGHT: 62 IN | DIASTOLIC BLOOD PRESSURE: 70 MMHG | WEIGHT: 129 LBS | BODY MASS INDEX: 23.74 KG/M2 | SYSTOLIC BLOOD PRESSURE: 110 MMHG

## 2020-03-10 DIAGNOSIS — N83.202 CYST OF LEFT OVARY: Primary | ICD-10-CM

## 2020-03-10 PROCEDURE — 99213 OFFICE O/P EST LOW 20 MIN: CPT | Performed by: NURSE PRACTITIONER

## 2020-03-10 NOTE — PATIENT INSTRUCTIONS

## 2020-03-10 NOTE — PROGRESS NOTES
"Ata Dinh is a 44 y.o. female    Patient is here today for follow-up of ovarian cyst.  She had an ultrasound done today and the cyst and fibroid are both stable.    Ovarian Cyst   This is a recurrent problem. The current episode started more than 1 year ago. Pertinent negatives include no abdominal pain, anorexia, arthralgias, change in bowel habit, chest pain, chills, congestion, coughing, diaphoresis, fatigue, fever, headaches, joint swelling, myalgias, nausea, neck pain, numbness, rash, sore throat, swollen glands, urinary symptoms, vertigo, visual change, vomiting or weakness. Nothing aggravates the symptoms. She has tried nothing for the symptoms.         /70 (BP Location: Left arm, Patient Position: Sitting, Cuff Size: Adult)   Ht 157.5 cm (62\")   Wt 58.5 kg (129 lb)   LMP 2020 (Exact Date)   BMI 23.59 kg/m²     Outpatient Encounter Medications as of 3/10/2020   Medication Sig Dispense Refill   • Calcium Citrate-Vitamin D (CALCIUM + D PO) Take  by mouth.     • Multiple Vitamins-Minerals (MULTIVITAMIN ADULT PO) Take  by mouth.     • Naproxen Sodium (ALEVE PO) Take  by mouth As Needed (last taken around noon today).     • [DISCONTINUED] albuterol (PROVENTIL) (2.5 MG/3ML) 0.083% nebulizer solution Take 2.5 mg by nebulization Every 4 (Four) Hours As Needed for Wheezing. 30 vial 0   • [DISCONTINUED] ALPRAZolam (XANAX) 0.25 MG tablet Take 1 tablet by mouth 3 (Three) Times a Day As Needed for Anxiety. 90 tablet 0   • [DISCONTINUED] buPROPion XL (WELLBUTRIN XL) 150 MG 24 hr tablet Take 1 tablet by mouth Every Morning. 30 tablet 12     No facility-administered encounter medications on file as of 3/10/2020.        Surgical History  Past Surgical History:   Procedure Laterality Date   •  SECTION     • COLON RESECTION      intassuception as an infant.   • WISDOM TOOTH EXTRACTION         Family History  Family History   Problem Relation Age of Onset   • Leukemia Father    • Heart " disease Mother    • Hypertension Brother    • Breast cancer Neg Hx    • Uterine cancer Neg Hx    • Ovarian cancer Neg Hx    • Colon cancer Neg Hx    • Melanoma Neg Hx    • Prostate cancer Neg Hx        The following portions of the patient's history were reviewed and updated as appropriate: allergies, current medications, past family history, past medical history, past social history, past surgical history and problem list.    Review of Systems   Constitutional: Negative for activity change, appetite change, chills, diaphoresis, fatigue, fever, unexpected weight gain and unexpected weight loss.   HENT: Negative for congestion, dental problem, drooling, ear discharge, ear pain, facial swelling, hearing loss, mouth sores, nosebleeds, postnasal drip, rhinorrhea, sinus pressure, sneezing, sore throat, swollen glands, tinnitus, trouble swallowing and voice change.    Eyes: Negative for blurred vision, double vision, photophobia, pain, discharge, redness, itching and visual disturbance.   Respiratory: Negative for apnea, cough, choking, chest tightness, shortness of breath, wheezing and stridor.    Cardiovascular: Negative for chest pain, palpitations and leg swelling.   Gastrointestinal: Negative for abdominal distention, abdominal pain, anal bleeding, anorexia, blood in stool, change in bowel habit, constipation, diarrhea, nausea, rectal pain, vomiting, GERD and indigestion.   Endocrine: Negative for cold intolerance, heat intolerance, polydipsia, polyphagia and polyuria.   Genitourinary: Negative for amenorrhea, breast discharge, breast lump, breast pain, decreased libido, decreased urine volume, difficulty urinating, dyspareunia, dysuria, flank pain, frequency, genital sores, hematuria, menstrual problem, pelvic pain, pelvic pressure, urgency, urinary incontinence, vaginal bleeding, vaginal discharge and vaginal pain.   Musculoskeletal: Negative for arthralgias, back pain, gait problem, joint swelling, myalgias, neck  pain, neck stiffness and bursitis.   Skin: Negative for color change, dry skin and rash.   Allergic/Immunologic: Negative for environmental allergies, food allergies and immunocompromised state.   Neurological: Negative for dizziness, vertigo, tremors, seizures, syncope, facial asymmetry, speech difficulty, weakness, light-headedness, numbness, headache, memory problem and confusion.   Hematological: Negative for adenopathy. Does not bruise/bleed easily.   Psychiatric/Behavioral: Negative for agitation, behavioral problems, decreased concentration, dysphoric mood, hallucinations, self-injury, sleep disturbance, suicidal ideas, negative for hyperactivity, depressed mood and stress. The patient is not nervous/anxious.        Objective   Physical Exam   Constitutional: She is oriented to person, place, and time. She appears well-developed and well-nourished.   HENT:   Head: Normocephalic and atraumatic.   Eyes: Conjunctivae are normal. Right eye exhibits no discharge. Left eye exhibits no discharge.   Neck: Normal range of motion. Neck supple. No thyromegaly present.   Cardiovascular: Normal rate, regular rhythm and normal heart sounds.   Pulmonary/Chest: Effort normal and breath sounds normal.   Neurological: She is alert and oriented to person, place, and time.   Skin: Skin is warm and dry.   Psychiatric: She has a normal mood and affect. Her behavior is normal. Judgment and thought content normal.   Nursing note and vitals reviewed.      Assessment/Plan   Tahira was seen today for ovarian cyst.    Diagnoses and all orders for this visit:    Cyst of left ovary  Comments:  Patient cyst has remained stable.  We discussed doing tumor markers, but because the cyst is stable she does not want to proceed with that at this time.  She states she has no pain but the first 2 days of her cycle are very heavy.  We discussed trying low Loestrin and she will call if she desires to have a prescription.  She will return here in August  for her yearly checkup or as needed       Patient's Body mass index is 23.59 kg/m². BMI is within normal parameters. No follow-up required..      Irais Calvillo, APRN  3/10/2020

## 2020-10-01 ENCOUNTER — OFFICE VISIT (OUTPATIENT)
Dept: OBSTETRICS AND GYNECOLOGY | Facility: CLINIC | Age: 45
End: 2020-10-01

## 2020-10-01 VITALS
DIASTOLIC BLOOD PRESSURE: 84 MMHG | WEIGHT: 131 LBS | BODY MASS INDEX: 24.11 KG/M2 | SYSTOLIC BLOOD PRESSURE: 138 MMHG | HEIGHT: 62 IN

## 2020-10-01 DIAGNOSIS — Z78.9 NON-SMOKER: ICD-10-CM

## 2020-10-01 DIAGNOSIS — Z01.419 ENCOUNTER FOR GYNECOLOGICAL EXAMINATION WITHOUT ABNORMAL FINDING: Primary | ICD-10-CM

## 2020-10-01 DIAGNOSIS — H61.23 EXCESSIVE CERUMEN IN BOTH EAR CANALS: ICD-10-CM

## 2020-10-01 DIAGNOSIS — Z30.011 VISIT FOR ORAL CONTRACEPTIVE PRESCRIPTION: ICD-10-CM

## 2020-10-01 LAB
B-HCG UR QL: NEGATIVE
INTERNAL NEGATIVE CONTROL: NEGATIVE
INTERNAL POSITIVE CONTROL: POSITIVE
Lab: NORMAL

## 2020-10-01 PROCEDURE — 87624 HPV HI-RISK TYP POOLED RSLT: CPT | Performed by: NURSE PRACTITIONER

## 2020-10-01 PROCEDURE — 99396 PREV VISIT EST AGE 40-64: CPT | Performed by: NURSE PRACTITIONER

## 2020-10-01 PROCEDURE — 81025 URINE PREGNANCY TEST: CPT | Performed by: NURSE PRACTITIONER

## 2020-10-01 PROCEDURE — G0123 SCREEN CERV/VAG THIN LAYER: HCPCS | Performed by: NURSE PRACTITIONER

## 2020-10-01 RX ORDER — NORETHINDRONE ACETATE AND ETHINYL ESTRADIOL, ETHINYL ESTRADIOL AND FERROUS FUMARATE 1MG-10(24)
KIT ORAL
Qty: 84 TABLET | Refills: 0 | Status: SHIPPED | OUTPATIENT
Start: 2020-10-01 | End: 2021-03-23

## 2020-10-01 NOTE — PROGRESS NOTES
"Ata Dinh is a 45 y.o. female.     Annual exam.         The following portions of the patient's history were reviewed and updated as appropriate: allergies, current medications, past family history, past medical history, past social history, past surgical history and problem list.    /84   Ht 157.5 cm (62\")   Wt 59.4 kg (131 lb)   LMP 09/15/2020   BMI 23.96 kg/m²     Review of Systems   Constitutional: Negative for activity change, appetite change, fatigue and fever.   HENT: Negative for congestion, sore throat and trouble swallowing.         Excessive cerumen bilaterally   Eyes: Negative for pain, discharge and visual disturbance.   Respiratory: Negative for apnea, shortness of breath and wheezing.    Cardiovascular: Negative for chest pain, palpitations and leg swelling.   Gastrointestinal: Negative for abdominal pain, constipation and diarrhea.   Genitourinary: Negative for frequency, pelvic pain, urgency and vaginal discharge.        Heavy 2 days, then light for 3      Musculoskeletal: Negative for back pain and gait problem.   Skin: Negative for color change and rash.   Neurological: Negative for dizziness, weakness and numbness.   Psychiatric/Behavioral: Negative for confusion and sleep disturbance.       Objective   Physical Exam  Vitals signs and nursing note reviewed. Exam conducted with a chaperone present.   Constitutional:       General: She is not in acute distress.     Appearance: She is well-developed. She is not diaphoretic.   HENT:      Head: Normocephalic.      Right Ear: External ear normal. There is impacted cerumen (fibers noted also).      Left Ear: External ear normal. There is impacted cerumen.      Nose: Nose normal.   Eyes:      General: No scleral icterus.        Right eye: No discharge.         Left eye: No discharge.      Conjunctiva/sclera: Conjunctivae normal.   Neck:      Musculoskeletal: Normal range of motion and neck supple.      Thyroid: No thyromegaly. "      Vascular: No carotid bruit.      Trachea: No tracheal deviation.   Cardiovascular:      Rate and Rhythm: Normal rate and regular rhythm.      Heart sounds: Normal heart sounds. No murmur.   Pulmonary:      Effort: Pulmonary effort is normal. No respiratory distress.      Breath sounds: Normal breath sounds. No wheezing.   Chest:      Breasts: Breasts are symmetrical.         Right: No inverted nipple, mass, nipple discharge, skin change or tenderness.         Left: No inverted nipple, mass, nipple discharge, skin change or tenderness.   Abdominal:      General: There is no distension.      Palpations: Abdomen is soft. There is no mass.      Tenderness: There is no abdominal tenderness. There is no guarding.      Hernia: No hernia is present. There is no hernia in the left inguinal area or right inguinal area.   Genitourinary:     General: Normal vulva.      Exam position: Lithotomy position.      Labia:         Right: No rash, tenderness, lesion or injury.         Left: No rash, tenderness, lesion or injury.       Vagina: Normal. No signs of injury and foreign body. No vaginal discharge, erythema, tenderness or bleeding.      Cervix: Normal.      Uterus: Not fixed and not tender.       Adnexa: Left adnexa normal.        Right: Fullness present. No tenderness.          Left: No mass, tenderness or fullness.        Rectum: Normal. No mass.      Comments:   BSU normal  Urethral meatus  Normal  Perineum  Normal  Firm uterus  Full nontender left adnexa  Musculoskeletal: Normal range of motion.         General: No tenderness.   Lymphadenopathy:      Head:      Right side of head: No submental, submandibular, tonsillar, preauricular, posterior auricular or occipital adenopathy.      Left side of head: No submental, submandibular, tonsillar, preauricular, posterior auricular or occipital adenopathy.      Cervical: No cervical adenopathy.      Right cervical: No superficial, deep or posterior cervical adenopathy.      Left cervical: No superficial, deep or posterior cervical adenopathy.      Lower Body: No right inguinal adenopathy. No left inguinal adenopathy.   Skin:     General: Skin is warm and dry.      Findings: No bruising, erythema or rash.   Neurological:      Mental Status: She is alert and oriented to person, place, and time.      Coordination: Coordination normal.   Psychiatric:         Mood and Affect: Mood normal.         Behavior: Behavior normal.         Thought Content: Thought content normal.         Judgment: Judgment normal.         Assessment/Plan    Well woman exam. Pap collected.     Discussed options to help with menses and hx of cyst.   Pt to follow up for US and OV r/t stable cyst, pt reports no changes noted.   Pt opts to discuss BC options.  Discussed BC options, risks and benefits.  Pt opts to start Lo Loestrin Fe.   Instructed pt about beginning OCP, side effects and S&S to report. Pt voiced understanding.    Discussed exam of ear canal.   Referral to ENT ordered.     Patient's Body mass index is 23.96 kg/m². BMI is within normal parameters. No follow-up required..    RV US and OV, follow up/prn.   Diagnoses and all orders for this visit:    1. Encounter for gynecological examination without abnormal finding (Primary)  -     Liquid-based Pap Smear, Screening  -     HPV DNA Probe, Direct - ThinPrep Vial, Cervix    2. BMI 24.0-24.9, adult    3. Non-smoker    4. Excessive cerumen in both ear canals  -     Ambulatory Referral to ENT (Otolaryngology)    5. Visit for oral contraceptive prescription  -     POC Pregnancy, Urine    Other orders  -     Norethin-Eth Estrad-Fe Biphas (Lo Loestrin Fe) 1 MG-10 MCG / 10 MCG tablet; 1 tablet daily  Dispense: 84 tablet; Refill: 0

## 2020-10-02 LAB
GEN CATEG CVX/VAG CYTO-IMP: NORMAL
HPV I/H RISK 4 DNA CVX QL PROBE+SIG AMP: NOT DETECTED
LAB AP CASE REPORT: NORMAL
LAB AP GYN ADDITIONAL INFORMATION: NORMAL
LAB AP GYN OTHER FINDINGS: NORMAL
PATH INTERP SPEC-IMP: NORMAL
STAT OF ADQ CVX/VAG CYTO-IMP: NORMAL

## 2020-10-07 ENCOUNTER — TELEPHONE (OUTPATIENT)
Dept: OBSTETRICS AND GYNECOLOGY | Facility: CLINIC | Age: 45
End: 2020-10-07

## 2020-10-07 NOTE — TELEPHONE ENCOUNTER
Pt called wanting her pap, HPV and pregnancy test results. Informed her that all were negative. Pt verbalized correct understanding.

## 2020-10-15 NOTE — PATIENT INSTRUCTIONS
"BMI for Adults  What is BMI?  Body mass index (BMI) is a number that is calculated from a person's weight and height. BMI can help estimate how much of a person's weight is composed of fat. BMI does not measure body fat directly. Rather, it is an alternative to procedures that directly measure body fat, which can be difficult and expensive.  BMI can help identify people who may be at higher risk for certain medical problems.  What are BMI measurements used for?  BMI is used as a screening tool to identify possible weight problems. It helps determine whether a person is obese, overweight, a healthy weight, or underweight.  BMI is useful for:  · Identifying a weight problem that may be related to a medical condition or may increase the risk for medical problems.  · Promoting changes, such as changes in diet and exercise, to help reach a healthy weight. BMI screening can be repeated to see if these changes are working.  How is BMI calculated?  BMI involves measuring your weight in relation to your height. Both height and weight are measured, and the BMI is calculated from those numbers. This can be done either in English (U.S[place] or metric measurements. Note that charts and online BMI calculators are available to help you find your BMI quickly and easily without having to do these calculations yourself.  To calculate your BMI in English (U.S[place] measurements:    1. Measure your weight in pounds (lb).  2. Multiply the number of pounds by 703.  ? For example, for a person who weighs 180 lb, multiply that number by 703, which equals 126,540.  3. Measure your height in inches. Then multiply that number by itself to get a measurement called \"inches squared.\"  ? For example, for a person who is 70 inches tall, the \"inches squared\" measurement is 70 inches x 70 inches, which equals 4,900 inches squared.  4. Divide the total from step 2 (number of lb x 703) by the total from step 3 (inches squared): 126,540 ÷ 4,900 = 25.8. This is " "your BMI.  To calculate your BMI in metric measurements:  1. Measure your weight in kilograms (kg).  2. Measure your height in meters (m). Then multiply that number by itself to get a measurement called \"meters squared.\"  ? For example, for a person who is 1.75 m tall, the \"meters squared\" measurement is 1.75 m x 1.75 m, which is equal to 3.1 meters squared.  3. Divide the number of kilograms (your weight) by the meters squared number. In this example: 70 ÷ 3.1 = 22.6. This is your BMI.  What do the results mean?  BMI charts are used to identify whether you are underweight, normal weight, overweight, or obese. The following guidelines will be used:  · Underweight: BMI less than 18.5.  · Normal weight: BMI between 18.5 and 24.9.  · Overweight: BMI between 25 and 29.9.  · Obese: BMI of 30 or above.  Keep these notes in mind:  · Weight includes both fat and muscle, so someone with a muscular build, such as an athlete, may have a BMI that is higher than 24.9. In cases like these, BMI is not an accurate measure of body fat.  · To determine if excess body fat is the cause of a BMI of 25 or higher, further assessments may need to be done by a health care provider.  · BMI is usually interpreted in the same way for men and women.  Where to find more information  For more information about BMI, including tools to quickly calculate your BMI, go to these websites:  · Centers for Disease Control and Prevention: www.cdc.gov  · American Heart Association: www.heart.org  · National Heart, Lung, and Blood [place]: www.nhlbi.nih.gov  Summary  · Body mass index (BMI) is a number that is calculated from a person's weight and height.  · BMI may help estimate how much of a person's weight is composed of fat. BMI can help identify those who may be at higher risk for certain medical problems.  · BMI can be measured using English measurements or metric measurements.  · BMI charts are used to identify whether you are underweight, normal " weight, overweight, or obese.  This information is not intended to replace advice given to you by your health care provider. Make sure you discuss any questions you have with your health care provider.  Document Released: 08/29/2005 Document Revised: 09/09/2020 Document Reviewed: 07/17/2020  Elsevier Patient Education © 2020 [employer]

## 2020-12-10 ENCOUNTER — OFFICE VISIT (OUTPATIENT)
Dept: OTOLARYNGOLOGY | Facility: CLINIC | Age: 45
End: 2020-12-10

## 2020-12-10 VITALS — HEART RATE: 115 BPM | SYSTOLIC BLOOD PRESSURE: 128 MMHG | TEMPERATURE: 97.6 F | DIASTOLIC BLOOD PRESSURE: 80 MMHG

## 2020-12-10 DIAGNOSIS — H61.23 BILATERAL IMPACTED CERUMEN: Primary | ICD-10-CM

## 2020-12-10 PROCEDURE — 69210 REMOVE IMPACTED EAR WAX UNI: CPT | Performed by: NURSE PRACTITIONER

## 2020-12-10 RX ORDER — CHLORAL HYDRATE 500 MG
CAPSULE ORAL
COMMUNITY

## 2020-12-10 NOTE — PROGRESS NOTES
PROCEDURE NOTE    LOCATION: [facility]  PROVIDER: EDWIN Toth  PREOPERATIVE DIAGNOSIS: Cerumen Impaction bilaterally   POSTOPERATIVE DIAGNOSIS: Same  PROCEDURE: Cerumen removal  ANESTHESIA: None   REASON FOR THE OPERATION: The patient verbally consented to intervention after a full discussion of risks, benefits, and alternatives. No guarantees were made or implied.   DETAILS OF THE OPERATION: The patient was reclined in the procedure room chair. The binocular microscope was used to visualize the ear canal and tympanic membrane. Cerumen was then removed from the both ears using a alligator forceps and suction. Findings: Bilateral EAC clear without lesion.  Bilateral tympanic membranes intact without effusion. Patient tolerated procedure well and was without complications

## 2021-03-23 ENCOUNTER — OFFICE VISIT (OUTPATIENT)
Dept: OBSTETRICS AND GYNECOLOGY | Facility: CLINIC | Age: 46
End: 2021-03-23

## 2021-03-23 VITALS
WEIGHT: 130 LBS | DIASTOLIC BLOOD PRESSURE: 70 MMHG | SYSTOLIC BLOOD PRESSURE: 108 MMHG | BODY MASS INDEX: 23.92 KG/M2 | HEIGHT: 62 IN

## 2021-03-23 DIAGNOSIS — N83.202 CYST OF LEFT OVARY: Primary | ICD-10-CM

## 2021-03-23 DIAGNOSIS — Z78.9 NON-SMOKER: ICD-10-CM

## 2021-03-23 PROCEDURE — 99213 OFFICE O/P EST LOW 20 MIN: CPT | Performed by: NURSE PRACTITIONER

## 2021-03-23 RX ORDER — MELOXICAM 15 MG/1
15 TABLET ORAL DAILY
COMMUNITY
Start: 2021-03-22 | End: 2022-08-24

## 2021-04-05 NOTE — PATIENT INSTRUCTIONS
"BMI for Adults  What is BMI?  Body mass index (BMI) is a number that is calculated from a person's weight and height. BMI can help estimate how much of a person's weight is composed of fat. BMI does not measure body fat directly. Rather, it is an alternative to procedures that directly measure body fat, which can be difficult and expensive.  BMI can help identify people who may be at higher risk for certain medical problems.  What are BMI measurements used for?  BMI is used as a screening tool to identify possible weight problems. It helps determine whether a person is obese, overweight, a healthy weight, or underweight.  BMI is useful for:  · Identifying a weight problem that may be related to a medical condition or may increase the risk for medical problems.  · Promoting changes, such as changes in diet and exercise, to help reach a healthy weight. BMI screening can be repeated to see if these changes are working.  How is BMI calculated?  BMI involves measuring your weight in relation to your height. Both height and weight are measured, and the BMI is calculated from those numbers. This can be done either in English (U.S[place] or metric measurements. Note that charts and online BMI calculators are available to help you find your BMI quickly and easily without having to do these calculations yourself.  To calculate your BMI in English (U.S[place] measurements:    1. Measure your weight in pounds (lb).  2. Multiply the number of pounds by 703.  ? For example, for a person who weighs 180 lb, multiply that number by 703, which equals 126,540.  3. Measure your height in inches. Then multiply that number by itself to get a measurement called \"inches squared.\"  ? For example, for a person who is 70 inches tall, the \"inches squared\" measurement is 70 inches x 70 inches, which equals 4,900 inches squared.  4. Divide the total from step 2 (number of lb x 703) by the total from step 3 (inches squared): 126,540 ÷ 4,900 = 25.8. This is " "your BMI.  To calculate your BMI in metric measurements:  1. Measure your weight in kilograms (kg).  2. Measure your height in meters (m). Then multiply that number by itself to get a measurement called \"meters squared.\"  ? For example, for a person who is 1.75 m tall, the \"meters squared\" measurement is 1.75 m x 1.75 m, which is equal to 3.1 meters squared.  3. Divide the number of kilograms (your weight) by the meters squared number. In this example: 70 ÷ 3.1 = 22.6. This is your BMI.  What do the results mean?  BMI charts are used to identify whether you are underweight, normal weight, overweight, or obese. The following guidelines will be used:  · Underweight: BMI less than 18.5.  · Normal weight: BMI between 18.5 and 24.9.  · Overweight: BMI between 25 and 29.9.  · Obese: BMI of 30 or above.  Keep these notes in mind:  · Weight includes both fat and muscle, so someone with a muscular build, such as an athlete, may have a BMI that is higher than 24.9. In cases like these, BMI is not an accurate measure of body fat.  · To determine if excess body fat is the cause of a BMI of 25 or higher, further assessments may need to be done by a health care provider.  · BMI is usually interpreted in the same way for men and women.  Where to find more information  For more information about BMI, including tools to quickly calculate your BMI, go to these websites:  · Centers for Disease Control and Prevention: www.cdc.gov  · American Heart Association: www.heart.org  · National Heart, Lung, and Blood [place]: www.nhlbi.nih.gov  Summary  · Body mass index (BMI) is a number that is calculated from a person's weight and height.  · BMI may help estimate how much of a person's weight is composed of fat. BMI can help identify those who may be at higher risk for certain medical problems.  · BMI can be measured using English measurements or metric measurements.  · BMI charts are used to identify whether you are underweight, normal " weight, overweight, or obese.  This information is not intended to replace advice given to you by your health care provider. Make sure you discuss any questions you have with your health care provider.  Document Revised: 09/09/2020 Document Reviewed: 07/17/2020  Elsevier Patient Education © 2021 [employer] Inc.

## 2021-11-24 ENCOUNTER — OFFICE VISIT (OUTPATIENT)
Dept: OTOLARYNGOLOGY | Facility: CLINIC | Age: 46
End: 2021-11-24

## 2021-11-24 VITALS
SYSTOLIC BLOOD PRESSURE: 136 MMHG | DIASTOLIC BLOOD PRESSURE: 88 MMHG | TEMPERATURE: 98.6 F | HEART RATE: 106 BPM | BODY MASS INDEX: 23.78 KG/M2 | HEIGHT: 62 IN

## 2021-11-24 DIAGNOSIS — H61.23 BILATERAL IMPACTED CERUMEN: Primary | ICD-10-CM

## 2021-11-24 PROCEDURE — 69210 REMOVE IMPACTED EAR WAX UNI: CPT | Performed by: NURSE PRACTITIONER

## 2021-12-14 NOTE — PROGRESS NOTES
PROCEDURE NOTE    LOCATION: [facility]  PROVIDER: EDWIN Toth  PREOPERATIVE DIAGNOSIS: Cerumen Impaction bilaterally   POSTOPERATIVE DIAGNOSIS: Same  PROCEDURE: Cerumen removal  ANESTHESIA: None   REASON FOR THE OPERATION: The patient verbally consented to intervention after a full discussion of risks, benefits, and alternatives. No guarantees were made or implied.   DETAILS OF THE OPERATION: The patient was reclined in the procedure room chair. The binocular microscope was used to visualize the ear canal and tympanic membrane. Cerumen was then removed from the both ears using a suction. Findings: Bilateral EAC clear without lesion.  Bilateral tympanic membranes intact without effusion. Patient tolerated procedure well and was without complications

## 2022-02-25 ENCOUNTER — OFFICE VISIT (OUTPATIENT)
Dept: OBSTETRICS AND GYNECOLOGY | Facility: CLINIC | Age: 47
End: 2022-02-25

## 2022-02-25 VITALS
HEIGHT: 62 IN | BODY MASS INDEX: 25.4 KG/M2 | DIASTOLIC BLOOD PRESSURE: 88 MMHG | SYSTOLIC BLOOD PRESSURE: 128 MMHG | WEIGHT: 138 LBS

## 2022-02-25 DIAGNOSIS — Z78.9 NON-SMOKER: ICD-10-CM

## 2022-02-25 DIAGNOSIS — Z01.419 ENCOUNTER FOR GYNECOLOGICAL EXAMINATION WITHOUT ABNORMAL FINDING: Primary | ICD-10-CM

## 2022-02-25 DIAGNOSIS — Z76.89 ENCOUNTER TO ESTABLISH CARE: ICD-10-CM

## 2022-02-25 DIAGNOSIS — Z12.11 ENCOUNTER FOR SCREENING FOR MALIGNANT NEOPLASM OF COLON: ICD-10-CM

## 2022-02-25 PROCEDURE — G0123 SCREEN CERV/VAG THIN LAYER: HCPCS | Performed by: NURSE PRACTITIONER

## 2022-02-25 PROCEDURE — 87624 HPV HI-RISK TYP POOLED RSLT: CPT | Performed by: NURSE PRACTITIONER

## 2022-02-25 PROCEDURE — 99396 PREV VISIT EST AGE 40-64: CPT | Performed by: NURSE PRACTITIONER

## 2022-02-25 NOTE — PATIENT INSTRUCTIONS
"BMI for Adults  What is BMI?  Body mass index (BMI) is a number that is calculated from a person's weight and height. BMI can help estimate how much of a person's weight is composed of fat. BMI does not measure body fat directly. Rather, it is an alternative to procedures that directly measure body fat, which can be difficult and expensive.  BMI can help identify people who may be at higher risk for certain medical problems.  What are BMI measurements used for?  BMI is used as a screening tool to identify possible weight problems. It helps determine whether a person is obese, overweight, a healthy weight, or underweight.  BMI is useful for:  · Identifying a weight problem that may be related to a medical condition or may increase the risk for medical problems.  · Promoting changes, such as changes in diet and exercise, to help reach a healthy weight. BMI screening can be repeated to see if these changes are working.  How is BMI calculated?  BMI involves measuring your weight in relation to your height. Both height and weight are measured, and the BMI is calculated from those numbers. This can be done either in English (U[place] or metric measurements. Note that charts and online BMI calculators are available to help you find your BMI quickly and easily without having to do these calculations yourself.  To calculate your BMI in English (U.S.) measurements:    1. Measure your weight in pounds (lb).  2. Multiply the number of pounds by 703.  ? For example, for a person who weighs 180 lb, multiply that number by 703, which equals 126,540.  3. Measure your height in inches. Then multiply that number by itself to get a measurement called \"inches squared.\"  ? For example, for a person who is 70 inches tall, the \"inches squared\" measurement is 70 inches x 70 inches, which equals 4,900 inches squared.  4. Divide the total from step 2 (number of lb x 703) by the total from step 3 (inches squared): 126,540 ÷ 4,900 = 25.8. This is " "your BMI.    To calculate your BMI in metric measurements:  1. Measure your weight in kilograms (kg).  2. Measure your height in meters (m). Then multiply that number by itself to get a measurement called \"meters squared.\"  ? For example, for a person who is 1.75 m tall, the \"meters squared\" measurement is 1.75 m x 1.75 m, which is equal to 3.1 meters squared.  3. Divide the number of kilograms (your weight) by the meters squared number. In this example: 70 ÷ 3.1 = 22.6. This is your BMI.  What do the results mean?  BMI charts are used to identify whether you are underweight, normal weight, overweight, or obese. The following guidelines will be used:  · Underweight: BMI less than 18.5.  · Normal weight: BMI between 18.5 and 24.9.  · Overweight: BMI between 25 and 29.9.  · Obese: BMI of 30 or above.  Keep these notes in mind:  · Weight includes both fat and muscle, so someone with a muscular build, such as an athlete, may have a BMI that is higher than 24.9. In cases like these, BMI is not an accurate measure of body fat.  · To determine if excess body fat is the cause of a BMI of 25 or higher, further assessments may need to be done by a health care provider.  · BMI is usually interpreted in the same way for men and women.  Where to find more information  For more information about BMI, including tools to quickly calculate your BMI, go to these websites:  · Centers for Disease Control and Prevention: www.cdc.gov  · American Heart Association: www.heart.org  · National Heart, Lung, and Blood [place]: www.nhlbi.nih.gov  Summary  · Body mass index (BMI) is a number that is calculated from a person's weight and height.  · BMI may help estimate how much of a person's weight is composed of fat. BMI can help identify those who may be at higher risk for certain medical problems.  · BMI can be measured using English measurements or metric measurements.  · BMI charts are used to identify whether you are underweight, normal " weight, overweight, or obese.  This information is not intended to replace advice given to you by your health care provider. Make sure you discuss any questions you have with your health care provider.  Document Revised: 09/09/2020 Document Reviewed: 07/17/2020  Elsevier Patient Education © 2021 [employer]

## 2022-02-25 NOTE — PROGRESS NOTES
"Chief Complaint  Annual Exam (Pt is here for an annual exam and pt had an US done as well to check on a cyst, pt has no complaints)    Subjective          Tahira HWANG Allegra presents to [facility]  History of Present Illness    Review of Systems   Constitutional: Negative for activity change, appetite change, fatigue and fever.   HENT: Negative for congestion, sore throat and trouble swallowing.    Eyes: Negative for pain, discharge and visual disturbance.   Respiratory: Negative for apnea, shortness of breath and wheezing.    Cardiovascular: Negative for chest pain, palpitations and leg swelling.   Gastrointestinal: Negative for abdominal pain, constipation and diarrhea.   Genitourinary: Negative for frequency, pelvic pain, urgency and vaginal discharge.   Musculoskeletal: Negative for back pain and gait problem.   Skin: Negative for color change and rash.   Neurological: Negative for dizziness, weakness and numbness.   Psychiatric/Behavioral: Negative for confusion and sleep disturbance.        Increase stress with work and oldest is now at college.   Working on adjusting to change.         Objective   Vital Signs:   /88   Ht 157.5 cm (62\")   Wt 62.6 kg (138 lb)   BMI 25.24 kg/m²     Physical Exam  Vitals and nursing note reviewed. Exam conducted with a chaperone present.   Constitutional:       General: She is not in acute distress.     Appearance: She is well-developed. She is not diaphoretic.   HENT:      Head: Normocephalic.      Right Ear: External ear normal.      Left Ear: External ear normal.      Nose: Nose normal.   Eyes:      General: No scleral icterus.        Right eye: No discharge.         Left eye: No discharge.      Conjunctiva/sclera: Conjunctivae normal.      Pupils: Pupils are equal, round, and reactive to light.   Neck:      Thyroid: No thyromegaly.      Vascular: No carotid bruit.      Trachea: No tracheal deviation.   Cardiovascular:      Rate and Rhythm: Normal " rate and regular rhythm.      Heart sounds: Normal heart sounds. No murmur heard.      Pulmonary:      Effort: Pulmonary effort is normal. No respiratory distress.      Breath sounds: Normal breath sounds. No wheezing.   Chest:   Breasts: Breasts are symmetrical.      Right: Normal. No swelling, bleeding, inverted nipple, mass, nipple discharge, skin change, tenderness, axillary adenopathy or supraclavicular adenopathy.      Left: Normal. No swelling, bleeding, inverted nipple, mass, nipple discharge, skin change, tenderness, axillary adenopathy or supraclavicular adenopathy.       Abdominal:      General: There is no distension.      Palpations: Abdomen is soft. There is no mass.      Tenderness: There is no abdominal tenderness. There is no right CVA tenderness, left CVA tenderness or guarding.      Hernia: No hernia is present. There is no hernia in the left inguinal area or right inguinal area.   Genitourinary:     General: Normal vulva.      Exam position: Lithotomy position.      Labia:         Right: No rash, tenderness, lesion or injury.         Left: No rash, tenderness, lesion or injury.       Vagina: Normal. No signs of injury and foreign body. No vaginal discharge, erythema, tenderness or bleeding.      Uterus: Normal. Not enlarged, not fixed and not tender.       Adnexa: Right adnexa normal and left adnexa normal.        Right: No mass, tenderness or fullness.          Left: No mass, tenderness or fullness.        Rectum: Normal. No mass.      Comments:   BSU normal  Urethral meatus  Normal  Perineum  Normal    Bleeding with collection   Musculoskeletal:         General: No tenderness. Normal range of motion.      Cervical back: Normal range of motion and neck supple.   Lymphadenopathy:      Head:      Right side of head: No submental, submandibular, tonsillar, preauricular, posterior auricular or occipital adenopathy.      Left side of head: No submental, submandibular, tonsillar, preauricular,  posterior auricular or occipital adenopathy.      Cervical: No cervical adenopathy.      Right cervical: No superficial, deep or posterior cervical adenopathy.     Left cervical: No superficial, deep or posterior cervical adenopathy.      Upper Body:      Right upper body: No supraclavicular, axillary or pectoral adenopathy.      Left upper body: No supraclavicular, axillary or pectoral adenopathy.      Lower Body: No right inguinal adenopathy. No left inguinal adenopathy.   Skin:     General: Skin is warm and dry.      Findings: No bruising, erythema or rash.   Neurological:      Mental Status: She is alert and oriented to person, place, and time.      Coordination: Coordination normal.   Psychiatric:         Mood and Affect: Mood normal.         Behavior: Behavior normal.         Thought Content: Thought content normal.         Judgment: Judgment normal.         Result Review :                     Assessment and Plan      Well woman GYN exam.   Pap smear done per ASCCP guidelines.   Will have lab work at this office.     Encouraged SBE, pt is aware how to do self breast exam and the importance of same.   Discussed weight management and importance of maintaining a healthy weight.   Discussed Vitamin D intake and the importance of adequate vitamin D for both Bone Health and a healthy immune system.    Discussed Daily exercise and the importance of same, in regards to a healthy heart as well as helping to maintain her weight and improving her mental health.     Colonoscopy, will refer.     Discussed STD prevention and testing.   Pt declines STD testing.     Mammogram, up to date.     Est care with PCP. Order submitted.      Reviewed US report and discussed with pt.   US report indicates uterus 8.14 cm, endometrial thickness 1.46 cm, anterior fundal serosal fibroid 4.80 cm, cyst appears the same as previous (complex 3.83 cm)  Will continue to get yearly US to evaluate stability.   Pt advised to call with any questions  or concerns.   Discussed S&S to report. Pt voiced understanding.     Diagnoses and all orders for this visit:    1. Encounter for gynecological examination without abnormal finding (Primary)  -     CBC & Differential  -     Comprehensive Metabolic Panel  -     Hemoglobin A1c  -     Lipid Panel With LDL / HDL Ratio  -     T4, Free  -     TSH  -     Vitamin D 25 Hydroxy  -     Liquid-based Pap Smear, Screening    2. Non-smoker    3. Encounter to establish care  -     Cancel: Ambulatory Referral to Family Practice  -     Ambulatory Referral to Family Practice    4. Encounter for screening for malignant neoplasm of colon  -     Ambulatory Referral For Screening Colonoscopy          Patient's Body mass index is 25.24 kg/m². indicating that she is overweight (BMI 25-29.9). Patient's (Body mass index is 25.24 kg/m².) indicates that they are overweight with health conditions that include none . Weight is worsening. BMI is is above average; no BMI management plan is appropriate. We discussed portion control and increasing exercise. .       Follow Up   Return for Annual physical, with US r/t left ovarian cyst.    Patient was given instructions and counseling regarding her condition or for health maintenance advice. Please see specific information pulled into the AVS if appropriate.

## 2022-08-15 ENCOUNTER — OFFICE VISIT (OUTPATIENT)
Dept: OBSTETRICS AND GYNECOLOGY | Facility: CLINIC | Age: 47
End: 2022-08-15

## 2022-08-15 VITALS
DIASTOLIC BLOOD PRESSURE: 82 MMHG | HEIGHT: 62 IN | BODY MASS INDEX: 26.5 KG/M2 | WEIGHT: 144 LBS | SYSTOLIC BLOOD PRESSURE: 114 MMHG

## 2022-08-15 DIAGNOSIS — N92.6 MENSTRUAL CHANGES: ICD-10-CM

## 2022-08-15 DIAGNOSIS — N83.202 OVARIAN CYST, BILATERAL: Primary | ICD-10-CM

## 2022-08-15 DIAGNOSIS — N83.201 OVARIAN CYST, BILATERAL: Primary | ICD-10-CM

## 2022-08-15 PROCEDURE — 99213 OFFICE O/P EST LOW 20 MIN: CPT | Performed by: NURSE PRACTITIONER

## 2022-08-15 RX ORDER — AZITHROMYCIN 250 MG/1
TABLET, FILM COATED ORAL SEE ADMIN INSTRUCTIONS
COMMUNITY
Start: 2022-07-15 | End: 2022-08-24

## 2022-08-15 NOTE — PROGRESS NOTES
"Chief Complaint  Ovarian Cyst (Pt is here for a f/u with US for ovarian cysts.  Pt c/o periods being irregular, pt states she has bled longer this last period.  )    Subjective          Tahira Dinh presents to [facility]  Longer periods      Review of Systems   Constitutional: Negative for activity change, appetite change, fatigue and fever.   Respiratory: Negative for apnea and shortness of breath.    Cardiovascular: Negative for chest pain and palpitations.   Gastrointestinal: Negative for abdominal distention, abdominal pain, constipation, diarrhea, nausea and vomiting.   Endocrine: Negative for cold intolerance and heat intolerance.   Genitourinary: Negative for difficulty urinating, frequency, menstrual problem, pelvic pain, vaginal discharge and vaginal pain.        Vary in cycle length approx 21-32 days    Normally 5-7 days  This time daily for 14 days    Pt is apprehensive about complex cyst remaining present.    Neurological: Negative for headaches.   Psychiatric/Behavioral: Negative for agitation and sleep disturbance.         Objective   Vital Signs:   /82   Ht 157.5 cm (62\")   Wt 65.3 kg (144 lb)   BMI 26.34 kg/m²     Physical Exam  Vitals reviewed.   Constitutional:       Appearance: She is well-developed.   Eyes:      General:         Right eye: No discharge.         Left eye: No discharge.   Cardiovascular:      Rate and Rhythm: Normal rate and regular rhythm.   Pulmonary:      Effort: Pulmonary effort is normal.      Breath sounds: Normal breath sounds.   Skin:     General: Skin is warm.   Neurological:      Mental Status: She is alert and oriented to person, place, and time.   Psychiatric:         Behavior: Behavior normal.         Thought Content: Thought content normal.         Judgment: Judgment normal.                      Assessment and Plan      Reviewed US report and discussed with pt.   Report indicates as follows:   Indication: Abnormal uterine bleeding, " ovarian cyst  No comparison made  Interpretation: The uterus is of normal size, 8.1 cm in length.  There is a 4.9 cm anterior fundal fibroid.  The endometrium measures 15.8 mm.  The right ovary contains a 5.6 cm simple cyst.  The left ovary contains a 3.5 cm complex cyst.  There is no free fluid.    Pt declines intervention today since first time irrregular bleeding but does want to follow up with md r/t possible D/C  Cyst remains the stable. New cyst simple present.   Pt is apprehensive r/t complex cyst (stable) and change in bleeding.   Pt to schedule for consult with MD.   Diagnoses and all orders for this visit:    1. Ovarian cyst, bilateral (Primary)    2. Menstrual changes          BMI is >= 25 and <30. (Overweight) The following options were offered after discussion;: weight loss educational material (shared in after visit summary)       Follow Up   Return for consult with Dr. Franks. .    Patient was given instructions and counseling regarding her condition or for health maintenance advice. Please see specific information pulled into the AVS if appropriate.

## 2022-08-15 NOTE — PATIENT INSTRUCTIONS

## 2022-08-24 ENCOUNTER — OFFICE VISIT (OUTPATIENT)
Dept: OBSTETRICS AND GYNECOLOGY | Facility: CLINIC | Age: 47
End: 2022-08-24

## 2022-08-24 VITALS
HEIGHT: 62 IN | SYSTOLIC BLOOD PRESSURE: 122 MMHG | WEIGHT: 142 LBS | DIASTOLIC BLOOD PRESSURE: 84 MMHG | BODY MASS INDEX: 26.13 KG/M2

## 2022-08-24 DIAGNOSIS — Z78.9 NON-SMOKER: ICD-10-CM

## 2022-08-24 DIAGNOSIS — N83.202 CYSTS OF BOTH OVARIES: Primary | ICD-10-CM

## 2022-08-24 DIAGNOSIS — N92.6 IRREGULAR MENSES: ICD-10-CM

## 2022-08-24 DIAGNOSIS — N83.201 CYSTS OF BOTH OVARIES: Primary | ICD-10-CM

## 2022-08-24 DIAGNOSIS — D25.9 UTERINE LEIOMYOMA, UNSPECIFIED LOCATION: ICD-10-CM

## 2022-08-24 DIAGNOSIS — N95.1 MENOPAUSAL SYMPTOMS: ICD-10-CM

## 2022-08-24 PROCEDURE — 99214 OFFICE O/P EST MOD 30 MIN: CPT | Performed by: OBSTETRICS & GYNECOLOGY

## 2022-08-24 RX ORDER — NORETHINDRONE ACETATE AND ETHINYL ESTRADIOL 1; 5 MG/1; UG/1
1 TABLET ORAL DAILY
Qty: 30 TABLET | Refills: 3 | Status: SHIPPED | OUTPATIENT
Start: 2022-08-24 | End: 2022-11-21 | Stop reason: SDUPTHER

## 2022-08-24 NOTE — PROGRESS NOTES
"Chief Complaint  Fibroids (C/o periods lasting longer and increasing in flow. US 8/15/22. Interested in hysterectomy but open to options. )    Subjective        Tahira ERI Allegra presents to [facility]  Patient presents to discuss menses  Menses are irregular and cycles can vary from 21 to 32 days  They are sometimes heavy and sometimes painful  Ultrasound done last week is reviewed  A stable appearing left ovarian cyst and fundal fibroid is noted.  A new right ovarian cyst measuring 5 cm which is simple as noted    She also has some mood changes and anxiety and irritability as well as some vaginal dryness and pain with intercourse.  She has no classic vasomotor symptoms.      Objective   Vital Signs:  /84 (BP Location: Left arm, Patient Position: Sitting)   Ht 157.5 cm (62\")   Wt 64.4 kg (142 lb)   BMI 25.97 kg/m²   Estimated body mass index is 25.97 kg/m² as calculated from the following:    Height as of this encounter: 157.5 cm (62\").    Weight as of this encounter: 64.4 kg (142 lb).          Physical Exam  Vitals and nursing note reviewed. Exam conducted with a chaperone present.   Constitutional:       Appearance: Normal appearance.   HENT:      Head: Normocephalic and atraumatic.   Abdominal:      General: Abdomen is flat. Bowel sounds are normal.      Palpations: Abdomen is soft.   Musculoskeletal:         General: Normal range of motion.      Cervical back: Normal range of motion and neck supple.   Skin:     General: Skin is warm and dry.   Neurological:      General: No focal deficit present.      Mental Status: She is alert and oriented to person, place, and time. Mental status is at baseline.   Psychiatric:         Mood and Affect: Mood normal.         Behavior: Behavior normal.         Thought Content: Thought content normal.         Judgment: Judgment normal.        Result Review :                Assessment and Plan   Diagnoses and all orders for this visit:    1. Cysts of " both ovaries (Primary)    2. Uterine leiomyoma, unspecified location    3. Irregular menses    4. Menopausal symptoms    5. Non-smoker    Other orders  -     norethindrone-ethinyl estradiol (FEMHRT 1/5) 1-5 MG-MCG tablet; Take 1 tablet by mouth Daily.  Dispense: 30 tablet; Refill: 3             Follow Up   Return in about 3 months (around 11/24/2022) for With Gyn U/S, with me.  Patient was given instructions and counseling regarding her condition or for health maintenance advice. Please see specific information pulled into the AVS if appropriate.   I think a lot of the symptoms could be perimenopausal  Short course of hormone replacement therapy.  Risks and benefits reviewed.  Call for concerns or questions    Samuel Franks MD

## 2022-08-26 ENCOUNTER — TELEPHONE (OUTPATIENT)
Dept: OBSTETRICS AND GYNECOLOGY | Facility: CLINIC | Age: 47
End: 2022-08-26

## 2022-08-26 NOTE — TELEPHONE ENCOUNTER
Pt called with questions about new medication she is starting.  Pt seen in office 8/24/22 and prescribed FEMHRT 1/5.  Pt was advised of multiple side effects when she went to pharmacy to  medication.  Pt called office today for advice and clarification.  Pt questions answered and education on medication given.  Pt states she has severe anxiety and needed reassurance.  Pt counseled and encouraged to call the office for any further questions and concerns.  Pt voiced understanding to all information and advice.

## 2022-11-21 ENCOUNTER — OFFICE VISIT (OUTPATIENT)
Dept: OBSTETRICS AND GYNECOLOGY | Facility: CLINIC | Age: 47
End: 2022-11-21

## 2022-11-21 VITALS
SYSTOLIC BLOOD PRESSURE: 118 MMHG | WEIGHT: 139 LBS | BODY MASS INDEX: 25.58 KG/M2 | HEIGHT: 62 IN | DIASTOLIC BLOOD PRESSURE: 74 MMHG

## 2022-11-21 DIAGNOSIS — N95.1 MENOPAUSAL SYMPTOMS: ICD-10-CM

## 2022-11-21 DIAGNOSIS — Z78.9 NON-SMOKER: ICD-10-CM

## 2022-11-21 DIAGNOSIS — N92.6 IRREGULAR MENSES: ICD-10-CM

## 2022-11-21 DIAGNOSIS — N94.10 DYSPAREUNIA, FEMALE: ICD-10-CM

## 2022-11-21 DIAGNOSIS — N83.202 CYST OF LEFT OVARY: ICD-10-CM

## 2022-11-21 DIAGNOSIS — D25.9 UTERINE LEIOMYOMA, UNSPECIFIED LOCATION: Primary | ICD-10-CM

## 2022-11-21 DIAGNOSIS — Z79.890 HORMONE REPLACEMENT THERAPY (HRT): ICD-10-CM

## 2022-11-21 DIAGNOSIS — N92.6 MENSTRUAL CHANGES: ICD-10-CM

## 2022-11-21 PROCEDURE — 99214 OFFICE O/P EST MOD 30 MIN: CPT | Performed by: OBSTETRICS & GYNECOLOGY

## 2022-11-21 RX ORDER — NORETHINDRONE ACETATE AND ETHINYL ESTRADIOL 1; 5 MG/1; UG/1
1 TABLET ORAL DAILY
Qty: 30 TABLET | Refills: 6 | Status: SHIPPED | OUTPATIENT
Start: 2022-11-21

## 2022-11-21 NOTE — PROGRESS NOTES
"Chief Complaint  Ovarian Cyst (Pt here for 3mo f/u on ovarian cyst and had u/s done today)    Subjective        Tahira Dinh presents to [facility]  History of Present Illness  Patient presents today for follow-up of multiple symptoms  The symptoms were addressed at her last visit with hormone replacement therapy  Her bleeding is nearly resolved  Anxiety and mood swings have also improved  She is uncertain if it is helped her vaginal dryness  She has not been sexually active and therefore does not know if it is helped with her pain with intercourse  Ultrasound is ordered and is reviewed  The previously noted simple cyst on the right ovary has resolved.  The left ovary still has a complex cyst on it consistent with an endometrioma.  However, it is smaller.  Ovarian Cyst        Objective   Vital Signs:  /74 (BP Location: Left arm, Patient Position: Sitting, Cuff Size: Adult)   Ht 157.5 cm (62\")   Wt 63 kg (139 lb)   BMI 25.42 kg/m²   Estimated body mass index is 25.42 kg/m² as calculated from the following:    Height as of this encounter: 157.5 cm (62\").    Weight as of this encounter: 63 kg (139 lb).          Physical Exam  Vitals and nursing note reviewed. Exam conducted with a chaperone present.   Constitutional:       Appearance: Normal appearance.   HENT:      Head: Normocephalic and atraumatic.   Abdominal:      General: Abdomen is flat. Bowel sounds are normal.      Palpations: Abdomen is soft.   Musculoskeletal:         General: Normal range of motion.      Cervical back: Normal range of motion and neck supple.   Skin:     General: Skin is warm and dry.   Neurological:      General: No focal deficit present.      Mental Status: She is alert and oriented to person, place, and time. Mental status is at baseline.   Psychiatric:         Mood and Affect: Mood normal.         Behavior: Behavior normal.         Thought Content: Thought content normal.         Judgment: Judgment " normal.        Result Review :                Assessment and Plan   Diagnoses and all orders for this visit:    1. Uterine leiomyoma, unspecified location (Primary)    2. Irregular menses    3. Menopausal symptoms    4. Menstrual changes    5. Cyst of left ovary    6. Dyspareunia, female    7. Hormone replacement therapy (HRT)    8. Non-smoker    Other orders  -     norethindrone-ethinyl estradiol (FEMHRT 1/5) 1-5 MG-MCG tablet; Take 1 tablet by mouth Daily.  Dispense: 30 tablet; Refill: 6             Follow Up   Return in about 6 months (around 5/21/2023) for with me, With Gyn U/S.  Patient was given instructions and counseling regarding her condition or for health maintenance advice. Please see specific information pulled into the AVS if appropriate.   Continue hormone replacement therapy  Call for concerns or questions    Samuel Franks MD

## 2023-05-30 ENCOUNTER — TELEPHONE (OUTPATIENT)
Dept: OBSTETRICS AND GYNECOLOGY | Facility: CLINIC | Age: 48
End: 2023-05-30

## 2023-05-30 NOTE — TELEPHONE ENCOUNTER
Caller: Tahira Dinh    Relationship to patient: Self    Best call back number: [phone]      Type of visit: ANNUAL AND NON OB U/S           Additional notes:PT HAD APPT ON 2/27/23 FOR ANNUAL AND U/S FOR F/U ON CYST - DR SUN WANTED TO SEE PT AROUND 5/21/23 FOR F/U U/S - PT WANTS ANNUAL APPT AND THE U/S FOR THE F/U SCHEDULED SAME DAY IF ANY WAY POSSIBLE AND FOR THE MONTH OF June (IF POSSIBLE) UNABLE TO WT CALL PLEASE CALL PT TO ADVISE IF THIS CAN BE DONE IN June

## 2023-08-09 ENCOUNTER — TELEPHONE (OUTPATIENT)
Dept: OTOLARYNGOLOGY | Facility: CLINIC | Age: 48
End: 2023-08-09

## 2023-08-09 NOTE — TELEPHONE ENCOUNTER
Provider: SAVANAH PERDUE  Caller: RAMU WOODWARD  Relationship to Patient: SELF  Reason for Call: SAME DAY CANCEL-SCHEDULE CONFLICT WITH [employer]ON[employer]

## 2023-09-25 ENCOUNTER — OFFICE VISIT (OUTPATIENT)
Dept: OTOLARYNGOLOGY | Facility: CLINIC | Age: 48
End: 2023-09-25

## 2023-09-25 VITALS — TEMPERATURE: 97.6 F

## 2023-09-25 DIAGNOSIS — H91.93 DECREASED HEARING OF BOTH EARS: ICD-10-CM

## 2023-09-25 DIAGNOSIS — H61.22 EXCESSIVE CERUMEN IN EAR CANAL, LEFT: Primary | ICD-10-CM

## 2023-09-25 PROCEDURE — 99212 OFFICE O/P EST SF 10 MIN: CPT | Performed by: NURSE PRACTITIONER

## 2023-09-25 PROCEDURE — 69210 REMOVE IMPACTED EAR WAX UNI: CPT | Performed by: NURSE PRACTITIONER

## 2023-09-25 NOTE — PROGRESS NOTES
EDWIN Bustillos  [facility] EAR [facility] & THROAT  [address], [address]  [place] [facility] [place] [address]  Fax [phone]  Phone [phone]      Visit Type: FOLLOW UP   Chief Complaint   Patient presents with    Cerumen Impaction        HPI  Tahira Dinh is a 47 y.o. female who presents for follow-up.  She has had complaints of bilateral cerumen impactions.  She also reports that she has recently had difficulty hearing.  She does use Q-tips.    History reviewed. No pertinent past medical history.    Past Surgical History:   Procedure Laterality Date     SECTION      X2    COLON RESECTION      intassuception as an infant.    WISDOM TOOTH EXTRACTION         Family History: Her family history includes Heart disease in her mother; Hypertension in her brother; Leukemia in her father.     Social History: She  reports that she has never smoked. She has never used smokeless tobacco. She reports current alcohol use. She reports that she does not use drugs.    Home Medications:  cholecalciferol, fish oil, multivitamin with minerals, and norethindrone-ethinyl estradiol    Allergies:  She has No Known Allergies.       Vital Signs:   Temp:  [97.6 °F (36.4 °C)] 97.6 °F (36.4 °C)  ENT Physical Exam  Constitutional  Appearance: patient appears well-developed, well-nourished and well-groomed, patient is cooperative;  Communication/Voice: communication appropriate for developmental age; vocal quality normal;  Ear  Auricles: right auricle normal; left auricle normal;  External Mastoids: right external mastoid normal; left external mastoid normal;  Ear Canals: right ear canal normal;  Tympanic Membranes: right tympanic membrane normal; left tympanic membrane normal;  Ear comments: Small amount of cerumen was against the left tympanic membrane.  It was removed under microscopy with suction and forceps.  Nose  External Nose: nares patent bilaterally;  Oral  Cavity/Oropharynx  Lips: normal;  Respiratory  Inspection: breathing unlabored;  Neurovestibular  Mental Status: alert and oriented;  Psychiatric: mood normal; affect is appropriate;   Ear Cerumen Removal    Date/Time: 9/25/2023 4:15 PM  Performed by: Mya Jarrett APRN  Authorized by: Mya Jarrett APRN   Consent: Verbal consent obtained. Written consent not obtained.  Consent given by: patient  Location details: left ear  Patient tolerance: patient tolerated the procedure well with no immediate complications  Procedure type: instrumentation, suction, alligator forceps   Sedation:  Patient sedated: no         Result Review    RESULTS REVIEW    I have reviewed the patients old records in the chart.    Assessment & Plan    Diagnoses and all orders for this visit:    1. Excessive cerumen in ear canal, left (Primary)  -     Ear Cerumen Removal    2. Decreased hearing of both ears  -     Comprehensive Hearing Test; Future       Cerumen was removed from the left EAC.  See procedure note.  We will follow-up with audiogram for further evaluation of decreased hearing.  She was instructed to call or return should any problems arise prior to her next office visit.    Return if symptoms worsen or fail to improve, for Recheck, With Audio.      Electronically signed by EDWIN Bustillos 09/25/23 4:50 PM CDT.

## 2024-07-15 ENCOUNTER — OFFICE VISIT (OUTPATIENT)
Age: 49
End: 2024-07-15
Payer: COMMERCIAL

## 2024-07-15 VITALS
SYSTOLIC BLOOD PRESSURE: 134 MMHG | BODY MASS INDEX: 25.21 KG/M2 | HEIGHT: 62 IN | DIASTOLIC BLOOD PRESSURE: 82 MMHG | WEIGHT: 137 LBS

## 2024-07-15 DIAGNOSIS — N95.1 MENOPAUSAL SYMPTOMS: ICD-10-CM

## 2024-07-15 DIAGNOSIS — Z01.419 WELL WOMAN EXAM WITH ROUTINE GYNECOLOGICAL EXAM: Primary | ICD-10-CM

## 2024-07-15 DIAGNOSIS — Z78.9 NON-SMOKER: ICD-10-CM

## 2024-07-15 DIAGNOSIS — Z12.31 SCREENING MAMMOGRAM FOR BREAST CANCER: ICD-10-CM

## 2024-07-15 PROCEDURE — 99396 PREV VISIT EST AGE 40-64: CPT | Performed by: OBSTETRICS & GYNECOLOGY

## 2024-07-15 PROCEDURE — 99459 PELVIC EXAMINATION: CPT | Performed by: OBSTETRICS & GYNECOLOGY

## 2024-07-15 RX ORDER — NORETHINDRONE ACETATE AND ETHINYL ESTRADIOL 1; 5 MG/1; UG/1
1 TABLET ORAL DAILY
Qty: 30 TABLET | Refills: 12 | Status: SHIPPED | OUTPATIENT
Start: 2024-07-15

## 2024-07-15 NOTE — PROGRESS NOTES
"Chief Complaint  Gynecologic Exam (Pt here for annual and f/u on ovarian cyst and had u/s done in office today, last pap 02/25/2022 negative cotesting, last mammogram 10/11/2023 normal, is scheduled for colonoscopy, did not have last year)    Subjective        Tahira Dinh presents to [facility]  History of Present Illness    Patient presents today for yearly exam  Self breast exam, diet, exercise, multivitamin use all discussed  All of her questions were answered to her liking  ASC guidelines were reviewed for Pap smear surveillance  She is not due for her Pap smear    Patient also presents for follow-up on menopausal symptoms  She is having good results on her current HRT  She wants to continue    Ultrasound is ordered and is reviewed  Stable appearing ovary and uterine fibroid      Objective   Vital Signs:  /82 (BP Location: Right arm, Patient Position: Sitting, Cuff Size: Adult)   Ht 157.5 cm (62\")   Wt 62.1 kg (137 lb)   BMI 25.06 kg/m²   Estimated body mass index is 25.06 kg/m² as calculated from the following:    Height as of this encounter: 157.5 cm (62\").    Weight as of this encounter: 62.1 kg (137 lb).             Physical Exam  Vitals and nursing note reviewed. Exam conducted with a chaperone present.   Constitutional:       Appearance: Normal appearance.   HENT:      Head: Normocephalic and atraumatic.      Mouth/Throat:      Mouth: Mucous membranes are moist.   Eyes:      Extraocular Movements: Extraocular movements intact.      Pupils: Pupils are equal, round, and reactive to light.   Neck:      Vascular: No carotid bruit.   Cardiovascular:      Rate and Rhythm: Normal rate and regular rhythm.      Pulses: Normal pulses.      Heart sounds: Normal heart sounds. No murmur heard.     No friction rub. No gallop.   Pulmonary:      Effort: Pulmonary effort is normal. No respiratory distress.      Breath sounds: Normal breath sounds. No stridor. No wheezing, rhonchi or " rales.   Chest:      Chest wall: No tenderness.   Breasts:     Breasts are symmetrical.      Right: Normal. No swelling, bleeding, inverted nipple, mass, nipple discharge, skin change or tenderness.      Left: Normal. No swelling, bleeding, inverted nipple, mass, nipple discharge, skin change or tenderness.   Abdominal:      General: Abdomen is flat. Bowel sounds are normal. There is no distension.      Palpations: Abdomen is soft. There is no mass.      Tenderness: There is no abdominal tenderness. There is no right CVA tenderness, left CVA tenderness, guarding or rebound.      Hernia: No hernia is present. There is no hernia in the left inguinal area or right inguinal area.   Genitourinary:     General: Normal vulva.      Exam position: Lithotomy position.      Pubic Area: No rash or pubic lice.       Labia:         Right: No rash, tenderness, lesion or injury.         Left: No rash, tenderness, lesion or injury.       Urethra: No prolapse, urethral pain, urethral swelling or urethral lesion.      Vagina: Normal.      Cervix: Normal.      Uterus: Normal. Not deviated, not enlarged, not fixed, not tender and no uterine prolapse.       Adnexa: Right adnexa normal and left adnexa normal.        Right: No mass, tenderness or fullness.          Left: No mass, tenderness or fullness.     Musculoskeletal:         General: Normal range of motion.      Cervical back: Normal range of motion and neck supple. No rigidity. No muscular tenderness.   Lymphadenopathy:      Cervical: No cervical adenopathy.      Upper Body:      Right upper body: No supraclavicular, axillary or pectoral adenopathy.      Left upper body: No supraclavicular, axillary or pectoral adenopathy.      Lower Body: No right inguinal adenopathy. No left inguinal adenopathy.   Skin:     General: Skin is warm and dry.   Neurological:      General: No focal deficit present.      Mental Status: She is alert and oriented to person, place, and time. Mental status  is at baseline.   Psychiatric:         Mood and Affect: Mood normal.         Behavior: Behavior normal.         Thought Content: Thought content normal.         Judgment: Judgment normal.        Result Review :                     Assessment and Plan     Diagnoses and all orders for this visit:    1. Well woman exam with routine gynecological exam (Primary)    2. Screening mammogram for breast cancer  -     Mammo Screening Digital Tomosynthesis Bilateral With CAD; Future    3. Menopausal symptoms  -     norethindrone-ethinyl estradiol (FEMHRT 1/5) 1-5 MG-MCG tablet; Take 1 tablet by mouth Daily.  Dispense: 30 tablet; Refill: 12    4. Non-smoker             Follow Up     Return in about 1 year (around 7/15/2025) for Annual physical, with me, With Gyn U/S.  Patient was given instructions and counseling regarding her condition or for health maintenance advice. Please see specific information pulled into the AVS if appropriate.     Call for concerns or questions    Samuel Franks MD

## 2024-10-15 DIAGNOSIS — Z12.31 SCREENING MAMMOGRAM FOR BREAST CANCER: ICD-10-CM

## 2024-11-20 ENCOUNTER — TELEMEDICINE (OUTPATIENT)
Age: 49
End: 2024-11-20
Payer: COMMERCIAL

## 2024-11-20 DIAGNOSIS — F41.9 ANXIETY: Primary | ICD-10-CM

## 2024-11-20 DIAGNOSIS — Z78.9 NON-SMOKER: ICD-10-CM

## 2024-11-20 RX ORDER — ALPRAZOLAM 0.25 MG/1
0.25 TABLET ORAL 3 TIMES DAILY PRN
Qty: 30 TABLET | Refills: 0 | Status: SHIPPED | OUTPATIENT
Start: 2024-11-20

## 2024-11-20 NOTE — PROGRESS NOTES
"Chief Complaint  No chief complaint on file.    Anxiety    Subjective           Tahira Dinh presents to [facility]  History of Present Illness    Patient presents today with anxiety  Her mother has been admitted to the hospital for possible recurrent cancer and pneumonia  She is requesting medication during this acute event    Objective   Vital Signs:   There were no vitals taken for this visit.    Estimated body mass index is 25.06 kg/m² as calculated from the following:    Height as of 7/15/24: 157.5 cm (62\").    Weight as of 7/15/24: 62.1 kg (137 lb).     Physical Exam   Constitutional: She appears well-developed and well-nourished.   HENT:   Head: Normocephalic and atraumatic.   Neurological: She is alert.   Psychiatric: She has a normal mood and affect.     Result Review :                   Assessment and Plan      Diagnoses and all orders for this visit:    1. Anxiety (Primary)  -     ALPRAZolam (Xanax) 0.25 MG tablet; Take 1 tablet by mouth 3 (Three) Times a Day As Needed for Anxiety.  Dispense: 30 tablet; Refill: 0    2. Non-smoker        Follow Up     No follow-ups on file.  Patient was given instructions and counseling regarding her condition or for health maintenance advice. Please see specific information pulled into the AVS if appropriate.     Mode of Visit: Video  Location of patient: home  Location of provider: [facility]  You have chosen to receive care through a telehealth visit.  The patient has signed the video visit consent form.  The visit included audio and video interaction. No technical issues occurred during this visit.    Alprazolam as needed  Call for concerns or questions    Samuel Franks MD    "

## 2025-04-29 ENCOUNTER — OFFICE VISIT (OUTPATIENT)
Dept: FAMILY MEDICINE CLINIC | Facility: CLINIC | Age: 50
End: 2025-04-29
Payer: COMMERCIAL

## 2025-04-29 VITALS
SYSTOLIC BLOOD PRESSURE: 149 MMHG | RESPIRATION RATE: 20 BRPM | BODY MASS INDEX: 23.92 KG/M2 | HEIGHT: 62 IN | HEART RATE: 99 BPM | WEIGHT: 130 LBS | DIASTOLIC BLOOD PRESSURE: 79 MMHG

## 2025-04-29 DIAGNOSIS — F43.21 GRIEVING: ICD-10-CM

## 2025-04-29 DIAGNOSIS — F33.8 OTHER RECURRENT DEPRESSIVE DISORDERS: Primary | ICD-10-CM

## 2025-04-29 DIAGNOSIS — J06.9 VIRAL URI: ICD-10-CM

## 2025-04-29 RX ORDER — DEXAMETHASONE SODIUM PHOSPHATE 4 MG/ML
8 INJECTION, SOLUTION INTRA-ARTICULAR; INTRALESIONAL; INTRAMUSCULAR; INTRAVENOUS; SOFT TISSUE ONCE
Status: COMPLETED | OUTPATIENT
Start: 2025-04-29 | End: 2025-04-29

## 2025-04-29 RX ORDER — VILAZODONE HYDROCHLORIDE 10 MG/1
10 TABLET ORAL DAILY
Qty: 30 TABLET | Refills: 1 | Status: SHIPPED | OUTPATIENT
Start: 2025-04-29

## 2025-04-29 RX ADMIN — DEXAMETHASONE SODIUM PHOSPHATE 8 MG: 4 INJECTION, SOLUTION INTRA-ARTICULAR; INTRALESIONAL; INTRAMUSCULAR; INTRAVENOUS; SOFT TISSUE at 09:48

## 2025-04-29 NOTE — PROGRESS NOTES
Chief Complaint  Depression    Subjective    History of Present Illness      Patient presents to [facility] for        History of Present Illness  The patient is a 49-year-old female presenting for her wellness visit and evaluation of depression and cold symptoms for 4 days.     She has been experiencing symptoms of a common cold for the past few days, including nasal congestion and a headache yesterday. No current sinus pressure or chest discomfort is reported. There has been no fever during this illness. Previous steroid injections have been received for similar symptoms.    The loss of her mother in December 2024 has significantly impacted her emotional well-being. Feelings of being overwhelmed by responsibilities, particularly towards her 16-year-old son, are reported, along with perceptions of neglectfulness and irritability. Despite these challenges, self-care routines such as dressing, grooming, and eating are maintained. Weight loss efforts have resulted in a 10-pound reduction, with an additional 5 pounds targeted. [place] classes have been incorporated into her routine, which she finds beneficial. No prior treatment for anxiety or depression has been sought, apart from hormone therapy. Counseling has been considered but not yet pursued due to time constraints. She resides in the same neighborhood as her father and maintains a close relationship with him. A previous prescription for Wellbutrin is recalled, but specific circumstances or effectiveness are not remembered.    Hormone therapy was previously prescribed for premenopausal symptoms, including hot flashes and intrusive thoughts, such as panic attacks at night. The therapy was effective in managing these symptoms, but it is believed to be less effective now due to the different nature of current stressors.    Acknowledgment of the need for blood work is made, but it cannot be completed today. A colonoscopy scheduled last  "year was canceled due to anxiety about hospitals, with plans to reschedule the procedure for the upcoming summer.    SOCIAL HISTORY  She has 2 children.    Review of Systems    I have reviewed and agree with the HPI and ROS information as above.  EDWIN Frank     Objective   Vital Signs:   /79   Pulse 99   Resp 20   Ht 157.5 cm (62\")   Wt 59 kg (130 lb)   BMI 23.78 kg/m²           Physical Exam     PARISH-7:      PHQ-2 Depression Screening    Little interest or pleasure in doing things? Not at all   Feeling down, depressed, or hopeless? Not at all   PHQ-2 Total Score 0      PHQ-9 Depression Screening  Little interest or pleasure in doing things? Not at all   Feeling down, depressed, or hopeless? Not at all   PHQ-2 Total Score 0   Trouble falling or staying asleep, or sleeping too much?     Feeling tired or having little energy?     Poor appetite or overeating?     Feeling bad about yourself - or that you are a failure or have let yourself or your family down?     Trouble concentrating on things, such as reading the newspaper or watching television?     Moving or speaking so slowly that other people could have noticed? Or the opposite - being so fidgety or restless that you have been moving around a lot more than usual?     Thoughts that you would be better off dead, or of hurting yourself in some way?     PHQ-9 Total Score     If you checked off any problems, how difficult have these problems made it for you to do your work, take care of things at home, or get along with other people? Not difficult at all           Result Review  Data Reviewed:                   Assessment and Plan      Diagnoses and all orders for this visit:    1. Other recurrent depressive disorders (Primary)  -     vilazodone (Viibryd) 10 MG tablet tablet; Take 1 tablet by mouth Daily.  Dispense: 30 tablet; Refill: 1    2. Grieving    3. Viral URI  -     dexAMETHasone (DECADRON) injection 8 mg        Assessment & Plan  1. " Depression.  -She will be initiated on a low dose of Viibryd 10 mg daily. Prescription sent to her pharmacy. Advised to monitor for adverse effects such as nausea or stomach upset and report any concerns promptly. If extreme vomiting occurs, discontinuation will be considered.    2. Cold.  - Reports symptoms consistent with a common cold, including nasal congestion and a headache.  - Physical exam findings indicate no fever or chest involvement.  - Steroid injection administered today to alleviate symptoms. Advised to monitor symptoms and report any worsening or new symptoms.    3. Health Maintenance.  - Acknowledges the need for blood work but unable to complete it today.  - Had a colonoscopy scheduled last year but canceled due to anxiety about hospitals. Plans to reschedule the procedure for the upcoming summer.    4. Premenopausal symptoms.  - Previously treated with hormone replacement therapy for premenopausal symptoms, including hot flashes and intrusive thoughts.  - Reports current emotional distress is at a different level, requiring additional intervention.  - Continues hormone replacement therapy alongside new antidepressant treatment.    Follow-up  - Follow-up scheduled in 1 month to assess response to Viibryd and overall emotional health.        Follow Up   Return in about 1 month (around 5/29/2025).  Patient was given instructions and counseling regarding her condition or for health maintenance advice. Please see specific information pulled into the AVS if appropriate.     Patient or patient representative verbalized consent for the use of Ambient Listening during the visit with  EDWIN Frank for chart documentation. 4/29/2025  10:09 CDT

## 2025-04-29 NOTE — PROGRESS NOTES
After obtaining consent, and per orders of Nyla Lock, injection of dexAMETHasone (DECADRON) injection 8 mg  given by Parul Tsai MA. Patient instructed to remain in clinic for 20 minutes afterwards, and to report any adverse reaction to me immediately.     Pt tolerated well

## 2025-06-20 ENCOUNTER — OFFICE VISIT (OUTPATIENT)
Dept: FAMILY MEDICINE CLINIC | Facility: CLINIC | Age: 50
End: 2025-06-20
Payer: COMMERCIAL

## 2025-06-20 VITALS
BODY MASS INDEX: 24.11 KG/M2 | WEIGHT: 131 LBS | HEIGHT: 62 IN | RESPIRATION RATE: 20 BRPM | SYSTOLIC BLOOD PRESSURE: 144 MMHG | HEART RATE: 84 BPM | DIASTOLIC BLOOD PRESSURE: 79 MMHG

## 2025-06-20 DIAGNOSIS — G47.00 ACUTE INSOMNIA: ICD-10-CM

## 2025-06-20 DIAGNOSIS — F33.8 OTHER RECURRENT DEPRESSIVE DISORDERS: Primary | ICD-10-CM

## 2025-06-20 DIAGNOSIS — H00.015 HORDEOLUM EXTERNUM LEFT LOWER EYELID: ICD-10-CM

## 2025-06-20 PROCEDURE — 99214 OFFICE O/P EST MOD 30 MIN: CPT | Performed by: NURSE PRACTITIONER

## 2025-06-20 RX ORDER — VILAZODONE HYDROCHLORIDE 20 MG/1
20 TABLET ORAL DAILY
Start: 2025-06-20

## 2025-06-20 RX ORDER — NEOMYCIN/POLYMYXIN B/DEXAMETHA 3.5-10K-.1
OINTMENT (GRAM) OPHTHALMIC (EYE) EVERY 6 HOURS SCHEDULED
Qty: 3.5 G | Refills: 0 | Status: SHIPPED | OUTPATIENT
Start: 2025-06-20 | End: 2025-06-27

## 2025-06-20 NOTE — PROGRESS NOTES
"Chief Complaint  Depression    Subjective    History of Present Illness      Patient presents to [facility] for        History of Present Illness  The patient presents for evaluation of depression and an infected stye.    She has been on a 30-day course of Viibryd 10mg, with some missed doses due to her busy schedule. She reports experiencing both good and bad days but is uncertain if these fluctuations are related to the medication. She has not experienced any side effects such as nausea or tremors but notes occasional stomach uneasiness and loose stools. She continues to struggle with emotional instability, as evidenced by an episode of crying and screaming the previous night. She also reports difficulty returning to sleep after waking up to use the bathroom at night. She is currently on a 10 mg dose of Viibryd and is considering increasing the dosage to 20 mg. She is curious about the potential side effects of this increase and whether it could negatively impact her sleep. She is also interested in understanding why Viibryd was chosen over other medications for her treatment.    She had an infected stye under her eye this week and has been on Augmentin. It was sore, puffy, and itchy on Wednesday but is better today. She is not currently using any ointment for the stye.    Review of Systems    I have reviewed and agree with the HPI and ROS information as above.  Nyla Dodge, EDWIN     Objective   Vital Signs:   /79   Pulse 84   Resp 20   Ht 157.5 cm (62\")   Wt 59.4 kg (131 lb)   BMI 23.96 kg/m²     BMI is within normal parameters. No other follow-up for BMI required.      Physical Exam  Constitutional:       Appearance: Normal appearance. She is well-developed.   HENT:      Head: Normocephalic and atraumatic.      Right Ear: Tympanic membrane, ear canal and external ear normal.      Left Ear: Tympanic membrane, ear canal and external ear normal.      Nose: Nose normal. " No septal deviation, nasal tenderness or congestion.      Mouth/Throat:      Lips: Pink. No lesions.      Mouth: Mucous membranes are moist. No oral lesions.      Dentition: Normal dentition.      Pharynx: Oropharynx is clear. No pharyngeal swelling, oropharyngeal exudate or posterior oropharyngeal erythema.   Eyes:      General: Lids are normal. Vision grossly intact. No scleral icterus.        Right eye: No discharge.         Left eye: Hordeolum (lower inner) present.No discharge.      Extraocular Movements: Extraocular movements intact.      Conjunctiva/sclera: Conjunctivae normal.      Right eye: Right conjunctiva is not injected.      Left eye: Left conjunctiva is not injected.      Pupils: Pupils are equal, round, and reactive to light.     Neck:      Thyroid: No thyroid mass.      Trachea: Trachea normal.   Cardiovascular:      Rate and Rhythm: Normal rate and regular rhythm.      Heart sounds: Normal heart sounds. No murmur heard.     No gallop.   Pulmonary:      Effort: Pulmonary effort is normal.      Breath sounds: Normal breath sounds and air entry. No wheezing, rhonchi or rales.   Abdominal:      General: There is no distension.      Palpations: Abdomen is soft. There is no mass.      Tenderness: There is no abdominal tenderness. There is no right CVA tenderness, left CVA tenderness, guarding or rebound.   Musculoskeletal:         General: No tenderness or deformity. Normal range of motion.      Cervical back: Full passive range of motion without pain, normal range of motion and neck supple.      Thoracic back: Normal.      Right lower leg: No edema.      Left lower leg: No edema.   Skin:     General: Skin is warm and dry.      Coloration: Skin is not jaundiced.      Findings: No rash.   Neurological:      Mental Status: She is alert and oriented to person, place, and time.      Sensory: Sensation is intact.      Motor: Motor function is intact.      Coordination: Coordination is intact.      Gait: Gait  is intact.      Deep Tendon Reflexes: Reflexes are normal and symmetric.   Psychiatric:         Mood and Affect: Mood and affect normal.         Judgment: Judgment normal.          PHQ-2 Depression Screening    Little interest or pleasure in doing things? Not at all   Feeling down, depressed, or hopeless? Not at all   PHQ-2 Total Score 0      PHQ-9 Depression Screening  Little interest or pleasure in doing things? Not at all   Feeling down, depressed, or hopeless? Not at all   PHQ-2 Total Score 0   Trouble falling or staying asleep, or sleeping too much?     Feeling tired or having little energy?     Poor appetite or overeating?     Feeling bad about yourself - or that you are a failure or have let yourself or your family down?     Trouble concentrating on things, such as reading the newspaper or watching television?     Moving or speaking so slowly that other people could have noticed? Or the opposite - being so fidgety or restless that you have been moving around a lot more than usual?     Thoughts that you would be better off dead, or of hurting yourself in some way?     PHQ-9 Total Score     If you checked off any problems, how difficult have these problems made it for you to do your work, take care of things at home, or get along with other people? Not difficult at all           Result Review  Data Reviewed:                   Assessment and Plan      Diagnoses and all orders for this visit:    1. Other recurrent depressive disorders (Primary)  -     vilazodone (Viibryd) 20 MG tablet tablet; Take 1 tablet by mouth Daily.    2. Acute insomnia    3. Hordeolum externum left lower eyelid  -     neomycin-polymyxin-dexamethasone (Maxitrol) 0.1 % ointment ophthalmic ointment; Administer  into the left eye Every 6 (Six) Hours for 7 days.  Dispense: 3.5 g; Refill: 0        Assessment & Plan  1. Depression.  - The dosage of Viibryd will be increased to 20 mg daily to achieve a more therapeutic effect. She is advised to  monitor her sleep pattern and report any changes.      2. Internal stye, left lower eyelid.  - She has been using Augmentin for an infected stye but requires additional treatment for the internal aspect.  - An ointment containing an antibiotic and a steroid will be prescribed. She is advised to apply the ointment by pulling her eyelid down and applying a small amount inside.  - This may blur her vision temporarily. Warm compresses should be applied a few times a day.  - She should complete the course of Augmentin.    Follow-up  The patient will follow up in 1 month.        Follow Up   Return in about 1 month (around 7/20/2025).  Patient was given instructions and counseling regarding her condition or for health maintenance advice. Please see specific information pulled into the AVS if appropriate.     Patient or patient representative verbalized consent for the use of Ambient Listening during the visit with  EDWIN Frank for chart documentation. 6/20/2025  09:22 CDT

## 2025-06-28 DIAGNOSIS — F33.8 OTHER RECURRENT DEPRESSIVE DISORDERS: ICD-10-CM

## 2025-07-01 RX ORDER — VILAZODONE HYDROCHLORIDE 10 MG/1
10 TABLET ORAL DAILY
Qty: 30 TABLET | Refills: 1 | OUTPATIENT
Start: 2025-07-01

## 2025-07-01 NOTE — TELEPHONE ENCOUNTER
[facility] at [address] requesting refill on Viibryd 10 mg.  Pt is no longer taking this dosage and script was discontinued on 6-20-25.  HUB MAY RELAY.

## 2025-07-14 DIAGNOSIS — F33.8 OTHER RECURRENT DEPRESSIVE DISORDERS: ICD-10-CM

## 2025-07-14 RX ORDER — VILAZODONE HYDROCHLORIDE 20 MG/1
20 TABLET ORAL DAILY
Qty: 30 TABLET | Refills: 0 | Status: SHIPPED | OUTPATIENT
Start: 2025-07-14 | End: 2025-07-18 | Stop reason: SDUPTHER

## 2025-07-14 NOTE — TELEPHONE ENCOUNTER
Caller: Tahira Dinh    Relationship: Self    Best call back number: [phone]     Requested Prescriptions: TOTALLY OUT. UPCOMING VISIT 07/18/25  Requested Prescriptions     Pending Prescriptions Disp Refills    vilazodone (Viibryd) 20 MG tablet tablet       Sig: Take 1 tablet by mouth Daily.        Pharmacy where request should be sent: [id number] [place] - [address] [phone] [facility] [phone]      Last office visit with prescribing clinician: 6/20/2025   Last telemedicine visit with prescribing clinician: Visit date not found   Next office visit with prescribing clinician: 7/18/2025     Additional details provided by patient: TOTALLY OUT    Does the patient have less than a 3 day supply:  [x] Yes  [] No    Would you like a call back once the refill request has been completed: [x] Yes [] No    If the office needs to give you a call back, can they leave a voicemail: [x] Yes [] No    Tommie Parr Rep   07/14/25 14:18 CDT

## 2025-07-16 ENCOUNTER — OFFICE VISIT (OUTPATIENT)
Age: 50
End: 2025-07-16
Payer: COMMERCIAL

## 2025-07-16 VITALS
HEIGHT: 62 IN | DIASTOLIC BLOOD PRESSURE: 98 MMHG | WEIGHT: 131.2 LBS | BODY MASS INDEX: 24.14 KG/M2 | SYSTOLIC BLOOD PRESSURE: 168 MMHG

## 2025-07-16 DIAGNOSIS — Z01.419 WELL WOMAN EXAM WITH ROUTINE GYNECOLOGICAL EXAM: Primary | ICD-10-CM

## 2025-07-16 DIAGNOSIS — N83.209 CYST OF OVARY, UNSPECIFIED LATERALITY: ICD-10-CM

## 2025-07-16 DIAGNOSIS — N95.1 MENOPAUSAL SYMPTOMS: ICD-10-CM

## 2025-07-16 DIAGNOSIS — Z12.31 SCREENING MAMMOGRAM FOR BREAST CANCER: ICD-10-CM

## 2025-07-16 DIAGNOSIS — Z78.9 NON-SMOKER: ICD-10-CM

## 2025-07-16 RX ORDER — NORETHINDRONE ACETATE AND ETHINYL ESTRADIOL 1; 5 MG/1; UG/1
1 TABLET ORAL DAILY
Qty: 30 TABLET | Refills: 12 | Status: SHIPPED | OUTPATIENT
Start: 2025-07-16

## 2025-07-16 NOTE — PROGRESS NOTES
Chief Complaint  Gynecologic Exam (Pt here for annual and denies any problems, US today for ovarian cyst, last pap 02/25/2022 negative cotesting, last mammogram 10/14/2024 normal at [facility], scheduled for colonoscopy in the fall of this year)    Subjective        Tahira Dinh presents to [facility]  History of Present Illness    History of Present Illness  The patient is a 49-year-old female who presents for a yearly exam. She is accompanied by a . She has a known fibroid that appears to be stable, with a slight increase in size. An ultrasound today shows that the fibroid looks about the same, maybe a little bit bigger, but it is not a cause for concern. Additionally, her ovarian cyst appears smaller and more simple, measuring 2.3 x 1.9 cm, compared to 2.6 cm last year.    She reports no current health issues. She has been on hormone therapy, which has effectively stopped her menstrual cycle. Occasionally, she experiences symptoms suggestive of an impending period, but these do not materialize. She recalls a previous instance where her uterine lining was thickened, but it subsequently returned to normal. She is not experiencing any abnormal bleeding. She is seeking a refill of her hormone prescription, which she believes is still beneficial for her. She was initially prescribed hormones to manage hot flashes and heavy periods, and she reports feeling more stable since starting the treatment.    She recently began taking an antidepressant due to mental health struggles following the death of her mother on 12/02/2024. Despite her efforts to cope, she experienced a breakdown in 04/2025 and sought help from her doctor. She was started on a low dose of Viibryd, which she has been taking for a few months. She has a follow-up appointment scheduled for Friday. She describes her mood as fluctuating, with good days and very sad days where she lacks motivation to get out of bed. She is  "considering therapy as an additional treatment option. She also reports experiencing anxiety in social situations, which is a new symptom for her. She started on Viibryd 10 mg but did not notice a significant improvement, so her dose was increased to 20 mg.    CONTRACEPTION: Hormone therapy    Objective   Vital Signs:  /98 (BP Location: Right arm, Patient Position: Sitting, Cuff Size: Adult)   Ht 157.5 cm (62.01\")   Wt 59.5 kg (131 lb 3.2 oz)   BMI 23.99 kg/m²   Estimated body mass index is 23.99 kg/m² as calculated from the following:    Height as of this encounter: 157.5 cm (62.01\").    Weight as of this encounter: 59.5 kg (131 lb 3.2 oz).    BMI is within normal parameters. No other follow-up for BMI required.      Physical Exam  Vitals and nursing note reviewed. Exam conducted with a chaperone present.   Constitutional:       Appearance: Normal appearance.   HENT:      Head: Normocephalic and atraumatic.      Mouth/Throat:      Mouth: Mucous membranes are moist.   Eyes:      Extraocular Movements: Extraocular movements intact.      Pupils: Pupils are equal, round, and reactive to light.   Neck:      Vascular: No carotid bruit.   Cardiovascular:      Rate and Rhythm: Normal rate and regular rhythm.      Pulses: Normal pulses.      Heart sounds: Normal heart sounds. No murmur heard.     No friction rub. No gallop.   Pulmonary:      Effort: Pulmonary effort is normal. No respiratory distress.      Breath sounds: Normal breath sounds. No stridor. No wheezing, rhonchi or rales.   Chest:      Chest wall: No tenderness.   Breasts:     Breasts are symmetrical.      Right: Normal. No swelling, bleeding, inverted nipple, mass, nipple discharge, skin change or tenderness.      Left: Normal. No swelling, bleeding, inverted nipple, mass, nipple discharge, skin change or tenderness.   Abdominal:      General: Abdomen is flat. Bowel sounds are normal. There is no distension.      Palpations: Abdomen is soft. There is " no mass.      Tenderness: There is no abdominal tenderness. There is no right CVA tenderness, left CVA tenderness, guarding or rebound.      Hernia: No hernia is present. There is no hernia in the left inguinal area or right inguinal area.   Genitourinary:     General: Normal vulva.      Exam position: Lithotomy position.      Pubic Area: No rash or pubic lice.       Labia:         Right: No rash, tenderness, lesion or injury.         Left: No rash, tenderness, lesion or injury.       Urethra: No prolapse, urethral pain, urethral swelling or urethral lesion.      Vagina: Normal.      Cervix: Normal.      Uterus: Normal. Not deviated, not enlarged, not fixed, not tender and no uterine prolapse.       Adnexa: Right adnexa normal and left adnexa normal.        Right: No mass, tenderness or fullness.          Left: No mass, tenderness or fullness.     Musculoskeletal:         General: Normal range of motion.      Cervical back: Normal range of motion and neck supple. No rigidity. No muscular tenderness.   Lymphadenopathy:      Cervical: No cervical adenopathy.      Upper Body:      Right upper body: No supraclavicular, axillary or pectoral adenopathy.      Left upper body: No supraclavicular, axillary or pectoral adenopathy.      Lower Body: No right inguinal adenopathy. No left inguinal adenopathy.   Skin:     General: Skin is warm and dry.   Neurological:      General: No focal deficit present.      Mental Status: She is alert and oriented to person, place, and time. Mental status is at baseline.   Psychiatric:         Mood and Affect: Mood normal.         Behavior: Behavior normal.         Thought Content: Thought content normal.         Judgment: Judgment normal.        Result Review :                Assessment and Plan   Diagnoses and all orders for this visit:    1. Well woman exam with routine gynecological exam (Primary)    2. Screening mammogram for breast cancer  -     Mammo Screening Digital Tomosynthesis  Bilateral With CAD    3. Cyst of ovary, unspecified laterality    4. Menopausal symptoms  -     norethindrone-ethinyl estradiol (FEMHRT 1/5) 1-5 MG-MCG tablet; Take 1 tablet by mouth Daily.  Dispense: 30 tablet; Refill: 12    5. Non-smoker        Assessment & Plan  1. Annual examination.  - Her fibroid remains stable with a slight increase in size, which is not a cause for concern at this time.  - The ovarian cyst has decreased in size from 2.6 to 2.3 x 1.9 and appears more simple, reducing the level of concern.  - Her uterine lining has thickened from 7.5 mm to 12 mm, but in the absence of bleeding, this is not alarming.  - A repeat ultrasound will be scheduled in 3 months during fall break to monitor the thickness of her uterine lining. If it continues to thicken, a D and C procedure may be necessary.    2. Depression.  - She has been experiencing significant emotional distress following the loss of her mother.  - Genetic testing for enzyme metabolism of antidepressants was discussed as a potential option if current treatment is not effective.  - She has been started on Viibryd 20 mg daily. She is encouraged to continue with her current medication and consider therapy for additional support. She will follow up with her primary care provider for further management.    Follow-up: A repeat ultrasound in 3 months during fall break.         Follow Up   Return in about 3 months (around 10/16/2025) for With Gyn U/S, with me.  Also schedule WWE in one year..  Patient was given instructions and counseling regarding her condition or for health maintenance advice. Please see specific information pulled into the AVS if appropriate.         Samuel Franks MD    Patient or patient representative verbalized consent for the use of Ambient Listening during the visit with  Samuel Franks MD for chart documentation. 7/16/2025  08:48 CDT

## 2025-07-18 ENCOUNTER — OFFICE VISIT (OUTPATIENT)
Dept: FAMILY MEDICINE CLINIC | Facility: CLINIC | Age: 50
End: 2025-07-18
Payer: COMMERCIAL

## 2025-07-18 VITALS
SYSTOLIC BLOOD PRESSURE: 133 MMHG | BODY MASS INDEX: 24.48 KG/M2 | DIASTOLIC BLOOD PRESSURE: 86 MMHG | WEIGHT: 133 LBS | RESPIRATION RATE: 20 BRPM | HEIGHT: 62 IN | HEART RATE: 73 BPM

## 2025-07-18 DIAGNOSIS — F33.8 OTHER RECURRENT DEPRESSIVE DISORDERS: ICD-10-CM

## 2025-07-18 DIAGNOSIS — F41.9 ANXIETY: Primary | ICD-10-CM

## 2025-07-18 PROCEDURE — 99214 OFFICE O/P EST MOD 30 MIN: CPT | Performed by: NURSE PRACTITIONER

## 2025-07-18 RX ORDER — VILAZODONE HYDROCHLORIDE 20 MG/1
20 TABLET ORAL DAILY
Qty: 90 TABLET | Refills: 0 | Status: SHIPPED | OUTPATIENT
Start: 2025-07-18

## 2025-07-18 NOTE — PROGRESS NOTES
Chief Complaint  Depression and Anxiety    Subjective    History of Present Illness      Patient presents to [facility] for        History of Present Illness  The patient presents for evaluation of anxiety and depression.     She reports an improvement in her condition, with a decrease in anxiety symptoms. She experienced anxiety during a recent outing with friends but managed to cope once she arrived at the venue. She has been on Viibryd 20 mg for a few weeks and has not noticed any significant changes in her dreams or sleep patterns. She takes the medication around lunchtime, which she believes has improved her sleep quality. However, she occasionally struggles to fall back asleep after waking up to use the bathroom. She plans to continue the medication for a few more months before reassessing its effectiveness. She took Xanax on 07/16/2025 due to anxiety about a hospital visit. She is considering therapy or counseling but wants to focus on one thing at a time. She does not have suicidal thoughts. She experiences loneliness when her  travels and her grown children are away. She finds that maintaining a routine helps manage her symptoms. She enjoys reading. She is currently taking Viibryd 20 mg and Xanax as needed.    During her annual check-up with Dr. Franks it was noted that she has a long-standing cyst on her left ovary and a fibroid. Her uterine lining was slightly thickened, prompting a recommendation for a follow-up in 3 months. She is currently on hormone pills and does not menstruate. She reports no heavy bleeding. The thickness of her uterine lining has varied over the years. Dr Gaffney notes reflect -If the thickening persists, a dilation and curettage procedure may be considered.    Social History:  Occupations:   Hobbies: Reading, running  Sleep: She goes to sleep easily but sometimes has trouble falling back asleep after waking up to use the  "bathroom.    Review of Systems    I have reviewed and agree with the HPI and ROS information as above.  Nyla Dodge, APRN     Objective   Vital Signs:   /86   Pulse 73   Resp 20   Ht 157.5 cm (62\")   Wt 60.3 kg (133 lb)   BMI 24.33 kg/m²     BMI is within normal parameters. No other follow-up for BMI required.      Physical Exam  Constitutional:       Appearance: Normal appearance. She is well-developed.   HENT:      Head: Normocephalic and atraumatic.      Right Ear: Tympanic membrane, ear canal and external ear normal.      Left Ear: Tympanic membrane, ear canal and external ear normal.      Nose: Nose normal. No septal deviation, nasal tenderness or congestion.      Mouth/Throat:      Lips: Pink. No lesions.      Mouth: Mucous membranes are moist. No oral lesions.      Dentition: Normal dentition.      Pharynx: Oropharynx is clear. No pharyngeal swelling, oropharyngeal exudate or posterior oropharyngeal erythema.   Eyes:      General: Lids are normal. Vision grossly intact. No scleral icterus.        Right eye: No discharge.         Left eye: No discharge.      Extraocular Movements: Extraocular movements intact.      Conjunctiva/sclera: Conjunctivae normal.      Right eye: Right conjunctiva is not injected.      Left eye: Left conjunctiva is not injected.      Pupils: Pupils are equal, round, and reactive to light.   Neck:      Thyroid: No thyroid mass.      Trachea: Trachea normal.   Cardiovascular:      Rate and Rhythm: Normal rate and regular rhythm.      Heart sounds: Normal heart sounds. No murmur heard.     No gallop.   Pulmonary:      Effort: Pulmonary effort is normal.      Breath sounds: Normal breath sounds and air entry. No wheezing, rhonchi or rales.   Abdominal:      General: There is no distension.      Palpations: Abdomen is soft. There is no mass.      Tenderness: There is no abdominal tenderness. There is no right CVA tenderness, left CVA tenderness, guarding or rebound. "   Musculoskeletal:         General: No tenderness or deformity. Normal range of motion.      Cervical back: Full passive range of motion without pain, normal range of motion and neck supple.      Thoracic back: Normal.      Right lower leg: No edema.      Left lower leg: No edema.   Skin:     General: Skin is warm and dry.      Coloration: Skin is not jaundiced.      Findings: No rash.   Neurological:      Mental Status: She is alert and oriented to person, place, and time.      Sensory: Sensation is intact.      Motor: Motor function is intact.      Coordination: Coordination is intact.      Gait: Gait is intact.      Deep Tendon Reflexes: Reflexes are normal and symmetric.   Psychiatric:         Mood and Affect: Mood and affect normal.         Judgment: Judgment normal.          PARISH-7:      PHQ-2 Depression Screening    Little interest or pleasure in doing things? Not at all   Feeling down, depressed, or hopeless? Not at all   PHQ-2 Total Score 0      PHQ-9 Depression Screening  Little interest or pleasure in doing things? Not at all   Feeling down, depressed, or hopeless? Not at all   PHQ-2 Total Score 0   Trouble falling or staying asleep, or sleeping too much?     Feeling tired or having little energy?     Poor appetite or overeating?     Feeling bad about yourself - or that you are a failure or have let yourself or your family down?     Trouble concentrating on things, such as reading the newspaper or watching television?     Moving or speaking so slowly that other people could have noticed? Or the opposite - being so fidgety or restless that you have been moving around a lot more than usual?     Thoughts that you would be better off dead, or of hurting yourself in some way?     PHQ-9 Total Score     If you checked off any problems, how difficult have these problems made it for you to do your work, take care of things at home, or get along with other people? Not difficult at all           Result Review  Data  Reviewed:              Office Visit with Samuel Franks MD (07/16/2025)      Assessment and Plan      Diagnoses and all orders for this visit:    1. Anxiety (Primary)    2. Other recurrent depressive disorders  -     vilazodone (Viibryd) 20 MG tablet tablet; Take 1 tablet by mouth Daily.  Dispense: 90 tablet; Refill: 0        Assessment & Plan  1. Anxiety and Depression  - Anxiety is being managed with Viibryd 20 mg, which has been taken for a few weeks. Improvement noted, but anxiety persists in certain situations.  - Reports fewer side effects, such as bathroom issues, and no significant impact on sleep despite occasional trouble returning to sleep after waking.  - Discussed Gene[employer]ight testing for medication optimization, but deemed unnecessary due to cost and current medication efficacy. Therapy or counseling recommended to address loneliness.  - Prescription for Viibryd 20 mg for 90 days provided. Advised to consider taking Viibryd in the morning with food to help with sleep disturbances.    2. Uterine lining thickening.  - Gynecologist plans to recheck thickness in 3 months. If thickening persists, a dilation and curettage (D & C) procedure may be considered.  - Patient reassured that the absence of bleeding is not alarming, and monitoring will continue.  - Encouraged to follow up with gynecologist as scheduled.    Follow-up  - Follow-up scheduled in 2 months.        Follow Up   Return in about 2 months (around 9/18/2025).  Patient was given instructions and counseling regarding her condition or for health maintenance advice. Please see specific information pulled into the AVS if appropriate.     Patient or patient representative verbalized consent for the use of Ambient Listening during the visit with  EDWIN Frank for chart documentation. 7/18/2025  10:21 CDT